# Patient Record
Sex: FEMALE | Race: WHITE | NOT HISPANIC OR LATINO | Employment: FULL TIME | URBAN - METROPOLITAN AREA
[De-identification: names, ages, dates, MRNs, and addresses within clinical notes are randomized per-mention and may not be internally consistent; named-entity substitution may affect disease eponyms.]

---

## 2024-03-28 ENCOUNTER — APPOINTMENT (OUTPATIENT)
Dept: GENERAL RADIOLOGY | Facility: HOSPITAL | Age: 39
DRG: 328 | End: 2024-03-28
Payer: COMMERCIAL

## 2024-03-28 ENCOUNTER — ANESTHESIA EVENT (OUTPATIENT)
Dept: PERIOP | Facility: HOSPITAL | Age: 39
End: 2024-03-28
Payer: COMMERCIAL

## 2024-03-28 ENCOUNTER — HOSPITAL ENCOUNTER (INPATIENT)
Facility: HOSPITAL | Age: 39
LOS: 4 days | Discharge: HOME | DRG: 328 | End: 2024-04-01
Attending: STUDENT IN AN ORGANIZED HEALTH CARE EDUCATION/TRAINING PROGRAM | Admitting: SURGERY
Payer: COMMERCIAL

## 2024-03-28 ENCOUNTER — ANESTHESIA (OUTPATIENT)
Dept: PERIOP | Facility: HOSPITAL | Age: 39
End: 2024-03-28
Payer: COMMERCIAL

## 2024-03-28 ENCOUNTER — APPOINTMENT (OUTPATIENT)
Dept: CT IMAGING | Facility: HOSPITAL | Age: 39
DRG: 328 | End: 2024-03-28
Payer: COMMERCIAL

## 2024-03-28 DIAGNOSIS — K63.1 PERFORATED BOWEL: ICD-10-CM

## 2024-03-28 DIAGNOSIS — Z98.84 HISTORY OF GASTRIC BYPASS: ICD-10-CM

## 2024-03-28 DIAGNOSIS — D72.829 LEUKOCYTOSIS, UNSPECIFIED TYPE: ICD-10-CM

## 2024-03-28 DIAGNOSIS — R19.8 PERFORATION OF VISCUS: Primary | ICD-10-CM

## 2024-03-28 LAB
ABO GROUP BLD: NORMAL
ALBUMIN SERPL-MCNC: 4.5 G/DL (ref 3.5–5.2)
ALBUMIN/GLOB SERPL: 2 G/DL
ALP SERPL-CCNC: 71 U/L (ref 39–117)
ALT SERPL W P-5'-P-CCNC: 15 U/L (ref 1–33)
ANION GAP SERPL CALCULATED.3IONS-SCNC: 13 MMOL/L (ref 5–15)
AST SERPL-CCNC: 15 U/L (ref 1–32)
BASOPHILS # BLD AUTO: 0.06 10*3/MM3 (ref 0–0.2)
BASOPHILS NFR BLD AUTO: 0.3 % (ref 0–1.5)
BILIRUB SERPL-MCNC: 0.6 MG/DL (ref 0–1.2)
BILIRUB UR QL STRIP: NEGATIVE
BLD GP AB SCN SERPL QL: NEGATIVE
BUN SERPL-MCNC: 11 MG/DL (ref 6–20)
BUN/CREAT SERPL: 12.9 (ref 7–25)
CALCIUM SPEC-SCNC: 9.1 MG/DL (ref 8.6–10.5)
CHLORIDE SERPL-SCNC: 111 MMOL/L (ref 98–107)
CLARITY UR: CLEAR
CO2 SERPL-SCNC: 21 MMOL/L (ref 22–29)
COLOR UR: YELLOW
CREAT SERPL-MCNC: 0.85 MG/DL (ref 0.57–1)
D-LACTATE SERPL-SCNC: 1.2 MMOL/L (ref 0.5–2)
DEPRECATED RDW RBC AUTO: 42.1 FL (ref 37–54)
EGFRCR SERPLBLD CKD-EPI 2021: 90.1 ML/MIN/1.73
EOSINOPHIL # BLD AUTO: 0.05 10*3/MM3 (ref 0–0.4)
EOSINOPHIL NFR BLD AUTO: 0.3 % (ref 0.3–6.2)
ERYTHROCYTE [DISTWIDTH] IN BLOOD BY AUTOMATED COUNT: 13.4 % (ref 12.3–15.4)
GLOBULIN UR ELPH-MCNC: 2.2 GM/DL
GLUCOSE SERPL-MCNC: 105 MG/DL (ref 65–99)
GLUCOSE UR STRIP-MCNC: NEGATIVE MG/DL
HCG SERPL QL: NEGATIVE
HCT VFR BLD AUTO: 45.4 % (ref 34–46.6)
HGB BLD-MCNC: 15.7 G/DL (ref 12–15.9)
HGB UR QL STRIP.AUTO: NEGATIVE
HOLD SPECIMEN: NORMAL
IMM GRANULOCYTES # BLD AUTO: 0.08 10*3/MM3 (ref 0–0.05)
IMM GRANULOCYTES NFR BLD AUTO: 0.4 % (ref 0–0.5)
KETONES UR QL STRIP: ABNORMAL
LEUKOCYTE ESTERASE UR QL STRIP.AUTO: NEGATIVE
LIPASE SERPL-CCNC: 29 U/L (ref 13–60)
LYMPHOCYTES # BLD AUTO: 1.1 10*3/MM3 (ref 0.7–3.1)
LYMPHOCYTES NFR BLD AUTO: 5.8 % (ref 19.6–45.3)
MCH RBC QN AUTO: 29.7 PG (ref 26.6–33)
MCHC RBC AUTO-ENTMCNC: 34.6 G/DL (ref 31.5–35.7)
MCV RBC AUTO: 85.8 FL (ref 79–97)
MONOCYTES # BLD AUTO: 0.6 10*3/MM3 (ref 0.1–0.9)
MONOCYTES NFR BLD AUTO: 3.1 % (ref 5–12)
NEUTROPHILS NFR BLD AUTO: 17.24 10*3/MM3 (ref 1.7–7)
NEUTROPHILS NFR BLD AUTO: 90.1 % (ref 42.7–76)
NITRITE UR QL STRIP: NEGATIVE
NRBC BLD AUTO-RTO: 0 /100 WBC (ref 0–0.2)
PH UR STRIP.AUTO: <=5 [PH] (ref 5–8)
PLATELET # BLD AUTO: 209 10*3/MM3 (ref 140–450)
PMV BLD AUTO: 12.2 FL (ref 6–12)
POTASSIUM SERPL-SCNC: 4.3 MMOL/L (ref 3.5–5.2)
PROT SERPL-MCNC: 6.7 G/DL (ref 6–8.5)
PROT UR QL STRIP: NEGATIVE
QT INTERVAL: 387 MS
QTC INTERVAL: 455 MS
RBC # BLD AUTO: 5.29 10*6/MM3 (ref 3.77–5.28)
RH BLD: POSITIVE
SODIUM SERPL-SCNC: 145 MMOL/L (ref 136–145)
SP GR UR STRIP: >=1.03 (ref 1–1.03)
T&S EXPIRATION DATE: NORMAL
TROPONIN T SERPL HS-MCNC: <6 NG/L
UROBILINOGEN UR QL STRIP: ABNORMAL
WBC NRBC COR # BLD AUTO: 19.13 10*3/MM3 (ref 3.4–10.8)
WHOLE BLOOD HOLD COAG: NORMAL
WHOLE BLOOD HOLD SPECIMEN: NORMAL

## 2024-03-28 PROCEDURE — 25010000002 VANCOMYCIN HCL 1.25 G RECONSTITUTED SOLUTION 1 EACH VIAL: Performed by: PHYSICIAN ASSISTANT

## 2024-03-28 PROCEDURE — G0378 HOSPITAL OBSERVATION PER HR: HCPCS

## 2024-03-28 PROCEDURE — 25010000002 FENTANYL CITRATE (PF) 50 MCG/ML SOLUTION: Performed by: NURSE ANESTHETIST, CERTIFIED REGISTERED

## 2024-03-28 PROCEDURE — 80053 COMPREHEN METABOLIC PANEL: CPT | Performed by: PHYSICIAN ASSISTANT

## 2024-03-28 PROCEDURE — 25010000002 HYDROMORPHONE PER 4 MG: Performed by: STUDENT IN AN ORGANIZED HEALTH CARE EDUCATION/TRAINING PROGRAM

## 2024-03-28 PROCEDURE — 36415 COLL VENOUS BLD VENIPUNCTURE: CPT

## 2024-03-28 PROCEDURE — 84703 CHORIONIC GONADOTROPIN ASSAY: CPT | Performed by: PHYSICIAN ASSISTANT

## 2024-03-28 PROCEDURE — 93005 ELECTROCARDIOGRAM TRACING: CPT | Performed by: PHYSICIAN ASSISTANT

## 2024-03-28 PROCEDURE — 25810000003 LACTATED RINGERS PER 1000 ML: Performed by: ANESTHESIOLOGY

## 2024-03-28 PROCEDURE — 86901 BLOOD TYPING SEROLOGIC RH(D): CPT | Performed by: PHYSICIAN ASSISTANT

## 2024-03-28 PROCEDURE — 25810000003 SODIUM CHLORIDE 0.9 % SOLUTION: Performed by: PHYSICIAN ASSISTANT

## 2024-03-28 PROCEDURE — 0DUA47Z SUPPLEMENT JEJUNUM WITH AUTOLOGOUS TISSUE SUBSTITUTE, PERCUTANEOUS ENDOSCOPIC APPROACH: ICD-10-PCS | Performed by: SURGERY

## 2024-03-28 PROCEDURE — 83605 ASSAY OF LACTIC ACID: CPT | Performed by: PHYSICIAN ASSISTANT

## 2024-03-28 PROCEDURE — 25810000003 LACTATED RINGERS PER 1000 ML: Performed by: NURSE ANESTHETIST, CERTIFIED REGISTERED

## 2024-03-28 PROCEDURE — 71045 X-RAY EXAM CHEST 1 VIEW: CPT

## 2024-03-28 PROCEDURE — 0DQA4ZZ REPAIR JEJUNUM, PERCUTANEOUS ENDOSCOPIC APPROACH: ICD-10-PCS | Performed by: SURGERY

## 2024-03-28 PROCEDURE — 85025 COMPLETE CBC W/AUTO DIFF WBC: CPT | Performed by: PHYSICIAN ASSISTANT

## 2024-03-28 PROCEDURE — 25010000002 ONDANSETRON PER 1 MG: Performed by: PHYSICIAN ASSISTANT

## 2024-03-28 PROCEDURE — 25510000001 IOPAMIDOL 61 % SOLUTION: Performed by: STUDENT IN AN ORGANIZED HEALTH CARE EDUCATION/TRAINING PROGRAM

## 2024-03-28 PROCEDURE — 25010000002 HYDROMORPHONE 1 MG/ML SOLUTION: Performed by: STUDENT IN AN ORGANIZED HEALTH CARE EDUCATION/TRAINING PROGRAM

## 2024-03-28 PROCEDURE — 83690 ASSAY OF LIPASE: CPT | Performed by: PHYSICIAN ASSISTANT

## 2024-03-28 PROCEDURE — 99285 EMERGENCY DEPT VISIT HI MDM: CPT

## 2024-03-28 PROCEDURE — 0DQ64ZZ REPAIR STOMACH, PERCUTANEOUS ENDOSCOPIC APPROACH: ICD-10-PCS | Performed by: SURGERY

## 2024-03-28 PROCEDURE — 0DU647Z SUPPLEMENT STOMACH WITH AUTOLOGOUS TISSUE SUBSTITUTE, PERCUTANEOUS ENDOSCOPIC APPROACH: ICD-10-PCS | Performed by: SURGERY

## 2024-03-28 PROCEDURE — 25010000002 MEROPENEM PER 100 MG: Performed by: PHYSICIAN ASSISTANT

## 2024-03-28 PROCEDURE — 93010 ELECTROCARDIOGRAM REPORT: CPT | Performed by: INTERNAL MEDICINE

## 2024-03-28 PROCEDURE — 87040 BLOOD CULTURE FOR BACTERIA: CPT | Performed by: PHYSICIAN ASSISTANT

## 2024-03-28 PROCEDURE — 86850 RBC ANTIBODY SCREEN: CPT | Performed by: PHYSICIAN ASSISTANT

## 2024-03-28 PROCEDURE — 84484 ASSAY OF TROPONIN QUANT: CPT | Performed by: PHYSICIAN ASSISTANT

## 2024-03-28 PROCEDURE — 74177 CT ABD & PELVIS W/CONTRAST: CPT

## 2024-03-28 PROCEDURE — 25010000002 MORPHINE PER 10 MG: Performed by: STUDENT IN AN ORGANIZED HEALTH CARE EDUCATION/TRAINING PROGRAM

## 2024-03-28 PROCEDURE — 25010000002 PROPOFOL 200 MG/20ML EMULSION: Performed by: NURSE ANESTHETIST, CERTIFIED REGISTERED

## 2024-03-28 PROCEDURE — 25810000003 SODIUM CHLORIDE 0.9 % SOLUTION 250 ML FLEX CONT: Performed by: PHYSICIAN ASSISTANT

## 2024-03-28 PROCEDURE — 86900 BLOOD TYPING SEROLOGIC ABO: CPT | Performed by: PHYSICIAN ASSISTANT

## 2024-03-28 PROCEDURE — 81003 URINALYSIS AUTO W/O SCOPE: CPT | Performed by: PHYSICIAN ASSISTANT

## 2024-03-28 RX ORDER — SODIUM CHLORIDE, SODIUM LACTATE, POTASSIUM CHLORIDE, CALCIUM CHLORIDE 600; 310; 30; 20 MG/100ML; MG/100ML; MG/100ML; MG/100ML
9 INJECTION, SOLUTION INTRAVENOUS CONTINUOUS
Status: DISCONTINUED | OUTPATIENT
Start: 2024-03-28 | End: 2024-03-29

## 2024-03-28 RX ORDER — SODIUM CHLORIDE 0.9 % (FLUSH) 0.9 %
10 SYRINGE (ML) INJECTION AS NEEDED
Status: DISCONTINUED | OUTPATIENT
Start: 2024-03-28 | End: 2024-03-29

## 2024-03-28 RX ORDER — ROCURONIUM BROMIDE 10 MG/ML
INJECTION, SOLUTION INTRAVENOUS AS NEEDED
Status: DISCONTINUED | OUTPATIENT
Start: 2024-03-28 | End: 2024-03-29 | Stop reason: SURG

## 2024-03-28 RX ORDER — SODIUM CHLORIDE 0.9 % (FLUSH) 0.9 %
3 SYRINGE (ML) INJECTION EVERY 12 HOURS SCHEDULED
Status: DISCONTINUED | OUTPATIENT
Start: 2024-03-28 | End: 2024-03-28 | Stop reason: HOSPADM

## 2024-03-28 RX ORDER — SODIUM CHLORIDE 0.9 % (FLUSH) 0.9 %
3-10 SYRINGE (ML) INJECTION AS NEEDED
Status: DISCONTINUED | OUTPATIENT
Start: 2024-03-28 | End: 2024-03-28 | Stop reason: HOSPADM

## 2024-03-28 RX ORDER — SODIUM CHLORIDE, SODIUM LACTATE, POTASSIUM CHLORIDE, CALCIUM CHLORIDE 600; 310; 30; 20 MG/100ML; MG/100ML; MG/100ML; MG/100ML
INJECTION, SOLUTION INTRAVENOUS CONTINUOUS PRN
Status: DISCONTINUED | OUTPATIENT
Start: 2024-03-28 | End: 2024-03-29 | Stop reason: SURG

## 2024-03-28 RX ORDER — PROPOFOL 10 MG/ML
INJECTION, EMULSION INTRAVENOUS AS NEEDED
Status: DISCONTINUED | OUTPATIENT
Start: 2024-03-28 | End: 2024-03-29 | Stop reason: SURG

## 2024-03-28 RX ORDER — FAMOTIDINE 10 MG/ML
20 INJECTION, SOLUTION INTRAVENOUS ONCE
Status: COMPLETED | OUTPATIENT
Start: 2024-03-28 | End: 2024-03-28

## 2024-03-28 RX ORDER — MIDAZOLAM HYDROCHLORIDE 1 MG/ML
1 INJECTION INTRAMUSCULAR; INTRAVENOUS
Status: DISCONTINUED | OUTPATIENT
Start: 2024-03-28 | End: 2024-03-28 | Stop reason: HOSPADM

## 2024-03-28 RX ORDER — LIDOCAINE HYDROCHLORIDE 10 MG/ML
0.5 INJECTION, SOLUTION INFILTRATION; PERINEURAL ONCE AS NEEDED
Status: DISCONTINUED | OUTPATIENT
Start: 2024-03-28 | End: 2024-03-28 | Stop reason: HOSPADM

## 2024-03-28 RX ORDER — HYDROMORPHONE HYDROCHLORIDE 1 MG/ML
0.5 INJECTION, SOLUTION INTRAMUSCULAR; INTRAVENOUS; SUBCUTANEOUS ONCE
Status: COMPLETED | OUTPATIENT
Start: 2024-03-28 | End: 2024-03-28

## 2024-03-28 RX ORDER — LIDOCAINE HYDROCHLORIDE 20 MG/ML
INJECTION, SOLUTION INFILTRATION; PERINEURAL AS NEEDED
Status: DISCONTINUED | OUTPATIENT
Start: 2024-03-28 | End: 2024-03-29 | Stop reason: SURG

## 2024-03-28 RX ORDER — FENTANYL CITRATE 50 UG/ML
50 INJECTION, SOLUTION INTRAMUSCULAR; INTRAVENOUS ONCE AS NEEDED
Status: DISCONTINUED | OUTPATIENT
Start: 2024-03-28 | End: 2024-03-28 | Stop reason: HOSPADM

## 2024-03-28 RX ORDER — FENTANYL CITRATE 50 UG/ML
INJECTION, SOLUTION INTRAMUSCULAR; INTRAVENOUS AS NEEDED
Status: DISCONTINUED | OUTPATIENT
Start: 2024-03-28 | End: 2024-03-29 | Stop reason: SURG

## 2024-03-28 RX ORDER — MORPHINE SULFATE 2 MG/ML
4 INJECTION, SOLUTION INTRAMUSCULAR; INTRAVENOUS ONCE
Status: COMPLETED | OUTPATIENT
Start: 2024-03-28 | End: 2024-03-28

## 2024-03-28 RX ORDER — SUCCINYLCHOLINE/SOD CL,ISO/PF 200MG/10ML
SYRINGE (ML) INTRAVENOUS AS NEEDED
Status: DISCONTINUED | OUTPATIENT
Start: 2024-03-28 | End: 2024-03-29 | Stop reason: SURG

## 2024-03-28 RX ORDER — ONDANSETRON 2 MG/ML
4 INJECTION INTRAMUSCULAR; INTRAVENOUS ONCE
Status: COMPLETED | OUTPATIENT
Start: 2024-03-28 | End: 2024-03-28

## 2024-03-28 RX ADMIN — FENTANYL CITRATE 25 MCG: 50 INJECTION, SOLUTION INTRAMUSCULAR; INTRAVENOUS at 23:29

## 2024-03-28 RX ADMIN — MORPHINE SULFATE 4 MG: 2 INJECTION, SOLUTION INTRAMUSCULAR; INTRAVENOUS at 18:16

## 2024-03-28 RX ADMIN — FENTANYL CITRATE 25 MCG: 50 INJECTION, SOLUTION INTRAMUSCULAR; INTRAVENOUS at 23:37

## 2024-03-28 RX ADMIN — HYDROMORPHONE HYDROCHLORIDE 0.5 MG: 1 INJECTION, SOLUTION INTRAMUSCULAR; INTRAVENOUS; SUBCUTANEOUS at 20:07

## 2024-03-28 RX ADMIN — Medication 80 MG: at 23:29

## 2024-03-28 RX ADMIN — SODIUM CHLORIDE, POTASSIUM CHLORIDE, SODIUM LACTATE AND CALCIUM CHLORIDE: 600; 310; 30; 20 INJECTION, SOLUTION INTRAVENOUS at 23:24

## 2024-03-28 RX ADMIN — ROCURONIUM BROMIDE 40 MG: 10 INJECTION, SOLUTION INTRAVENOUS at 23:44

## 2024-03-28 RX ADMIN — HYDROMORPHONE HYDROCHLORIDE 0.5 MG: 1 INJECTION, SOLUTION INTRAMUSCULAR; INTRAVENOUS; SUBCUTANEOUS at 23:52

## 2024-03-28 RX ADMIN — FAMOTIDINE 20 MG: 10 INJECTION INTRAVENOUS at 23:06

## 2024-03-28 RX ADMIN — SODIUM CHLORIDE, POTASSIUM CHLORIDE, SODIUM LACTATE AND CALCIUM CHLORIDE 9 ML/HR: 600; 310; 30; 20 INJECTION, SOLUTION INTRAVENOUS at 23:06

## 2024-03-28 RX ADMIN — LIDOCAINE HYDROCHLORIDE 50 MG: 20 INJECTION, SOLUTION INFILTRATION; PERINEURAL at 23:29

## 2024-03-28 RX ADMIN — ROCURONIUM BROMIDE 10 MG: 10 INJECTION, SOLUTION INTRAVENOUS at 23:29

## 2024-03-28 RX ADMIN — PROPOFOL 200 MG: 10 INJECTION, EMULSION INTRAVENOUS at 23:29

## 2024-03-28 RX ADMIN — MEROPENEM 1000 MG: 1 INJECTION, POWDER, FOR SOLUTION INTRAVENOUS at 20:51

## 2024-03-28 RX ADMIN — VANCOMYCIN HYDROCHLORIDE 1250 MG: 1.25 INJECTION, POWDER, LYOPHILIZED, FOR SOLUTION INTRAVENOUS at 21:59

## 2024-03-28 RX ADMIN — HYDROMORPHONE HYDROCHLORIDE 1 MG: 1 INJECTION, SOLUTION INTRAMUSCULAR; INTRAVENOUS; SUBCUTANEOUS at 22:42

## 2024-03-28 RX ADMIN — ONDANSETRON 4 MG: 2 INJECTION INTRAMUSCULAR; INTRAVENOUS at 18:17

## 2024-03-28 RX ADMIN — SODIUM CHLORIDE 1000 ML: 9 INJECTION, SOLUTION INTRAVENOUS at 18:17

## 2024-03-28 RX ADMIN — IOPAMIDOL 85 ML: 612 INJECTION, SOLUTION INTRAVENOUS at 19:37

## 2024-03-28 NOTE — ED TRIAGE NOTES
Pt to ED via EMS with complaints of LLQ pain radiating to L shoulder, feels like spasms per EMS. X4 hours

## 2024-03-28 NOTE — ED PROVIDER NOTES
EMERGENCY DEPARTMENT MD ATTESTATION NOTE    Room Number:  05/05  PCP: Provider, No Known  Independent Historians: Patient and Family    HPI:    Context: Radha Vargas is a 38 y.o. female who presents to the ED c/o acute left lower quadrant abdominal pain that radiates up into her chest and neck.  Symptoms began acutely around 2:00 today.  Patient additionally notes spasms.        Review of prior external notes (non-ED) -and- Review of prior external test results outside of this encounter: Office visit with primary care from 11/22/2023 reviewed and notable for visit secondary to right shoulder pain.  Patient was diagnosed with right rotator cuff tear with plan for operative repair on 12/21.    PHYSICAL EXAM    I have reviewed the triage vital signs and nursing notes.    ED Triage Vitals [03/28/24 1741]   Temp Heart Rate Resp BP SpO2   97.6 °F (36.4 °C) 60 20 117/62 100 %      Temp src Heart Rate Source Patient Position BP Location FiO2 (%)   -- -- -- -- --       Physical Exam  GENERAL: alert, no acute distress  SKIN: Warm, dry  HENT: Normocephalic, atraumatic  EYES: no scleral icterus  CV: regular rhythm, regular rate  RESPIRATORY: normal effort, lungs clear  ABDOMEN: soft, severe tenderness to palpation in the left lower quadrant MUSCULOSKELETAL: no deformity  NEURO: alert, moves all extremities, follows commands            MEDICATIONS GIVEN IN ER  Medications   sodium chloride 0.9 % flush 10 mL (has no administration in time range)   sodium chloride 0.9 % flush 10 mL (has no administration in time range)   sodium chloride 0.9 % bolus 1,000 mL (1,000 mL Intravenous New Bag 3/28/24 1817)   ondansetron (ZOFRAN) injection 4 mg (4 mg Intravenous Given 3/28/24 1817)   morphine injection 4 mg (4 mg Intravenous Given 3/28/24 1816)         ORDERS PLACED DURING THIS VISIT:  Orders Placed This Encounter   Procedures    XR Chest 1 View    CT Abdomen Pelvis With Contrast    [place] Draw    Comprehensive Metabolic Panel     Lipase    hCG, Serum, Qualitative    Urinalysis With Microscopic If Indicated (No Culture) - Urine, Clean Catch    Single High Sensitivity Troponin T    CBC Auto Differential    ECG 12 Lead Other; neck pain    Insert peripheral IV    Insert Peripheral IV    Green Top (Gel)    Lavender Top    Gold Top - SST    Gray Top    Light Blue Top    CBC & Differential         PROGRESS, DATA ANALYSIS, CONSULTS, AND MEDICAL DECISION MAKING  All labs have been independently interpreted by me.  All radiology studies have been reviewed by me. All EKG's have been independently viewed and interpreted by me.  Discussion below represents my analysis of pertinent findings related to patient's condition, differential diagnosis, treatment plan and final disposition.    Differential diagnosis includes but is not limited to diverticulitis, SBO, volvulus, gastroenteritis.    Clinical Scores:                   ED Course as of 03/28/24 2320   Thu Mar 28, 2024   1833 EKG interpreted by me demonstrates sinus rhythm, rate 83, no WI/QT prolongation, no ST elevation [MW]   1848 Lipase: 29 [DC]   1848 WBC(!): 19.13 [DC]   1848 Hemoglobin: 15.7 [DC]   1848 Platelets: 209 [DC]   1848 Creatinine: 0.85 [DC]   1848 Sodium: 145 [DC]   1848 Potassium: 4.3 [DC]   1848 CO2(!): 21.0 [DC]   1848 HS Troponin T: <6 [DC]   2002 Discussed CT with radiologist, Dr. Connor, who reports free air, possibly gastric in origin is noted. Pt has abnormal anatomy secondary to surgical history. No obvious diverticulitis. [DC]   2007 Amoxicillin caused him to swell.  Will give patient meropenem and vancomycin per pharmacy recommendation.  [DC]   2007 Discussed results with patient and plan for antibiotics and admission.  He reports gastric bypass was performed by Dr. Hansen in [place] about 3 years ago.  She did have a hernia repair performed after the surgery but no other reported complications. [DC]   2039 Discussed case with Dr. Colon, surgery, who recommends consult  with bariatric surgery for possible complication from previous gastric bypass. [DC]   2040 Lactate: 1.2 [DC]   2055 Discussed case with Dr. Whitley, bariatric surgery, who recommends discussing with patient's surgeon and transfer to [place] for care. [DC]   2155 Repaged bariatric surgery, Dr. Grace. [DC]   2231 We have paged Dr. Grace's surgical group on-call 4 times but have not received return call.  Repaged Dr. Whitley and discussed situation.  He will come in and recommends taking patient to the OR at this time.  Discussed plan with patient who is agreeable. [DC]      ED Course User Index  [DC] Samantha Cuellar PA  [MW] Demetri Brunner MD       MDM: 38-year-old female presenting for evaluation of severe abdominal pain.  Patient with noted leukocytosis of 20 and severe tenderness to palpation.  Radiological evaluation demonstrates findings concerning for free air and history of bariatric surgery.  There is concern for probable ruptured viscus.  Patient will be initiated on vancomycin and meropenem.  Patient's primary bariatric surgery and has not in town here.  We consulted our bariatric services who recommend transfer to patient's home facility.  We are unable to get a hold of patient's initial surgeon.  Our surgical services graciously agreed to take patient to the OR for emergent intervention.      COMPLEXITY OF CARE  The patient requires admission.    Please note that portions of this document were completed with a voice recognition program.    Note Disclaimer: At [facility], we believe that sharing information builds trust and better relationships. You are receiving this note because you recently visited [facility]. It is possible you will see health information before a provider has talked with you about it. This kind of information can be easy to misunderstand. To help you fully understand what it means for your health, we urge you to discuss this note with your provider.         Kannan  Demetri ADRIAN MD  03/28/24 0464

## 2024-03-29 PROBLEM — D72.829 LEUKOCYTOSIS: Status: ACTIVE | Noted: 2024-03-29

## 2024-03-29 PROBLEM — Z98.84 HISTORY OF GASTRIC BYPASS: Status: ACTIVE | Noted: 2024-03-29

## 2024-03-29 PROBLEM — R19.8 PERFORATION OF VISCUS: Status: ACTIVE | Noted: 2024-03-29

## 2024-03-29 LAB
ALBUMIN SERPL-MCNC: 3.7 G/DL (ref 3.5–5.2)
ALBUMIN/GLOB SERPL: 1.7 G/DL
ALP SERPL-CCNC: 54 U/L (ref 39–117)
ALT SERPL W P-5'-P-CCNC: 67 U/L (ref 1–33)
ANION GAP SERPL CALCULATED.3IONS-SCNC: 11 MMOL/L (ref 5–15)
AST SERPL-CCNC: 50 U/L (ref 1–32)
BASOPHILS # BLD AUTO: 0.01 10*3/MM3 (ref 0–0.2)
BASOPHILS NFR BLD AUTO: 0.1 % (ref 0–1.5)
BILIRUB SERPL-MCNC: 0.6 MG/DL (ref 0–1.2)
BUN SERPL-MCNC: 10 MG/DL (ref 6–20)
BUN/CREAT SERPL: 12 (ref 7–25)
CALCIUM SPEC-SCNC: 8.3 MG/DL (ref 8.6–10.5)
CHLORIDE SERPL-SCNC: 112 MMOL/L (ref 98–107)
CO2 SERPL-SCNC: 20 MMOL/L (ref 22–29)
CREAT SERPL-MCNC: 0.83 MG/DL (ref 0.57–1)
DEPRECATED RDW RBC AUTO: 39.8 FL (ref 37–54)
EGFRCR SERPLBLD CKD-EPI 2021: 92.7 ML/MIN/1.73
EOSINOPHIL # BLD AUTO: 0 10*3/MM3 (ref 0–0.4)
EOSINOPHIL NFR BLD AUTO: 0 % (ref 0.3–6.2)
ERYTHROCYTE [DISTWIDTH] IN BLOOD BY AUTOMATED COUNT: 13 % (ref 12.3–15.4)
GLOBULIN UR ELPH-MCNC: 2.2 GM/DL
GLUCOSE SERPL-MCNC: 161 MG/DL (ref 65–99)
HCT VFR BLD AUTO: 36.8 % (ref 34–46.6)
HGB BLD-MCNC: 12.3 G/DL (ref 12–15.9)
IMM GRANULOCYTES # BLD AUTO: 0.13 10*3/MM3 (ref 0–0.05)
IMM GRANULOCYTES NFR BLD AUTO: 0.8 % (ref 0–0.5)
LYMPHOCYTES # BLD AUTO: 0.38 10*3/MM3 (ref 0.7–3.1)
LYMPHOCYTES NFR BLD AUTO: 2.3 % (ref 19.6–45.3)
MAGNESIUM SERPL-MCNC: 1.6 MG/DL (ref 1.6–2.6)
MCH RBC QN AUTO: 28.2 PG (ref 26.6–33)
MCHC RBC AUTO-ENTMCNC: 33.4 G/DL (ref 31.5–35.7)
MCV RBC AUTO: 84.4 FL (ref 79–97)
MONOCYTES # BLD AUTO: 0.42 10*3/MM3 (ref 0.1–0.9)
MONOCYTES NFR BLD AUTO: 2.5 % (ref 5–12)
NEUTROPHILS NFR BLD AUTO: 15.79 10*3/MM3 (ref 1.7–7)
NEUTROPHILS NFR BLD AUTO: 94.3 % (ref 42.7–76)
NRBC BLD AUTO-RTO: 0 /100 WBC (ref 0–0.2)
PHOSPHATE SERPL-MCNC: 3.2 MG/DL (ref 2.5–4.5)
PLATELET # BLD AUTO: 162 10*3/MM3 (ref 140–450)
PMV BLD AUTO: 12 FL (ref 6–12)
POTASSIUM SERPL-SCNC: 4.4 MMOL/L (ref 3.5–5.2)
PROT SERPL-MCNC: 5.9 G/DL (ref 6–8.5)
RBC # BLD AUTO: 4.36 10*6/MM3 (ref 3.77–5.28)
SODIUM SERPL-SCNC: 143 MMOL/L (ref 136–145)
WBC NRBC COR # BLD AUTO: 16.73 10*3/MM3 (ref 3.4–10.8)

## 2024-03-29 PROCEDURE — 85025 COMPLETE CBC W/AUTO DIFF WBC: CPT | Performed by: SURGERY

## 2024-03-29 PROCEDURE — 25010000002 ONDANSETRON PER 1 MG: Performed by: NURSE ANESTHETIST, CERTIFIED REGISTERED

## 2024-03-29 PROCEDURE — 25010000002 MEROPENEM PER 100 MG: Performed by: SURGERY

## 2024-03-29 PROCEDURE — 25010000002 HYDROMORPHONE 1 MG/ML SOLUTION: Performed by: NURSE ANESTHETIST, CERTIFIED REGISTERED

## 2024-03-29 PROCEDURE — 80053 COMPREHEN METABOLIC PANEL: CPT | Performed by: SURGERY

## 2024-03-29 PROCEDURE — G0378 HOSPITAL OBSERVATION PER HR: HCPCS

## 2024-03-29 PROCEDURE — 25010000002 FLUCONAZOLE PER 200 MG: Performed by: SURGERY

## 2024-03-29 PROCEDURE — 87070 CULTURE OTHR SPECIMN AEROBIC: CPT | Performed by: SURGERY

## 2024-03-29 PROCEDURE — 25010000002 METOCLOPRAMIDE PER 10 MG: Performed by: SURGERY

## 2024-03-29 PROCEDURE — 25010000002 SUGAMMADEX 200 MG/2ML SOLUTION: Performed by: NURSE ANESTHETIST, CERTIFIED REGISTERED

## 2024-03-29 PROCEDURE — 25010000002 HYDROMORPHONE PER 4 MG: Performed by: NURSE ANESTHETIST, CERTIFIED REGISTERED

## 2024-03-29 PROCEDURE — 87015 SPECIMEN INFECT AGNT CONCNTJ: CPT | Performed by: SURGERY

## 2024-03-29 PROCEDURE — 87205 SMEAR GRAM STAIN: CPT | Performed by: SURGERY

## 2024-03-29 PROCEDURE — 87147 CULTURE TYPE IMMUNOLOGIC: CPT | Performed by: SURGERY

## 2024-03-29 PROCEDURE — 25010000002 HYDROMORPHONE PER 4 MG: Performed by: SURGERY

## 2024-03-29 PROCEDURE — 83735 ASSAY OF MAGNESIUM: CPT | Performed by: SURGERY

## 2024-03-29 PROCEDURE — 84100 ASSAY OF PHOSPHORUS: CPT | Performed by: SURGERY

## 2024-03-29 PROCEDURE — 25810000003 LACTATED RINGERS PER 1000 ML: Performed by: SURGERY

## 2024-03-29 RX ORDER — MAGNESIUM HYDROXIDE 1200 MG/15ML
LIQUID ORAL AS NEEDED
Status: DISCONTINUED | OUTPATIENT
Start: 2024-03-29 | End: 2024-03-29 | Stop reason: HOSPADM

## 2024-03-29 RX ORDER — BUPIVACAINE HYDROCHLORIDE AND EPINEPHRINE 5; 5 MG/ML; UG/ML
INJECTION, SOLUTION EPIDURAL; INTRACAUDAL; PERINEURAL AS NEEDED
Status: DISCONTINUED | OUTPATIENT
Start: 2024-03-28 | End: 2024-03-29 | Stop reason: HOSPADM

## 2024-03-29 RX ORDER — HYDRALAZINE HYDROCHLORIDE 20 MG/ML
10 INJECTION INTRAMUSCULAR; INTRAVENOUS
Status: DISCONTINUED | OUTPATIENT
Start: 2024-03-29 | End: 2024-04-01 | Stop reason: HOSPADM

## 2024-03-29 RX ORDER — EPHEDRINE SULFATE 50 MG/ML
5 INJECTION, SOLUTION INTRAVENOUS ONCE AS NEEDED
Status: DISCONTINUED | OUTPATIENT
Start: 2024-03-29 | End: 2024-03-29 | Stop reason: HOSPADM

## 2024-03-29 RX ORDER — IPRATROPIUM BROMIDE AND ALBUTEROL SULFATE 2.5; .5 MG/3ML; MG/3ML
3 SOLUTION RESPIRATORY (INHALATION) ONCE AS NEEDED
Status: DISCONTINUED | OUTPATIENT
Start: 2024-03-29 | End: 2024-03-29 | Stop reason: HOSPADM

## 2024-03-29 RX ORDER — PROCHLORPERAZINE EDISYLATE 5 MG/ML
10 INJECTION INTRAMUSCULAR; INTRAVENOUS EVERY 6 HOURS PRN
Status: DISCONTINUED | OUTPATIENT
Start: 2024-03-29 | End: 2024-04-01 | Stop reason: HOSPADM

## 2024-03-29 RX ORDER — HYDRALAZINE HYDROCHLORIDE 20 MG/ML
5 INJECTION INTRAMUSCULAR; INTRAVENOUS
Status: DISCONTINUED | OUTPATIENT
Start: 2024-03-29 | End: 2024-03-29 | Stop reason: HOSPADM

## 2024-03-29 RX ORDER — NALOXONE HCL 0.4 MG/ML
0.2 VIAL (ML) INJECTION AS NEEDED
Status: DISCONTINUED | OUTPATIENT
Start: 2024-03-29 | End: 2024-03-29 | Stop reason: HOSPADM

## 2024-03-29 RX ORDER — CETIRIZINE HYDROCHLORIDE 10 MG/1
10 TABLET ORAL DAILY
COMMUNITY

## 2024-03-29 RX ORDER — ONDANSETRON 2 MG/ML
4 INJECTION INTRAMUSCULAR; INTRAVENOUS ONCE AS NEEDED
Status: DISCONTINUED | OUTPATIENT
Start: 2024-03-29 | End: 2024-03-29 | Stop reason: HOSPADM

## 2024-03-29 RX ORDER — CYANOCOBALAMIN 1000 UG/ML
1000 INJECTION, SOLUTION INTRAMUSCULAR; SUBCUTANEOUS ONCE
Status: COMPLETED | OUTPATIENT
Start: 2024-03-30 | End: 2024-03-30

## 2024-03-29 RX ORDER — LABETALOL HYDROCHLORIDE 5 MG/ML
5 INJECTION, SOLUTION INTRAVENOUS
Status: DISCONTINUED | OUTPATIENT
Start: 2024-03-29 | End: 2024-03-29 | Stop reason: HOSPADM

## 2024-03-29 RX ORDER — DROPERIDOL 2.5 MG/ML
0.62 INJECTION, SOLUTION INTRAMUSCULAR; INTRAVENOUS
Status: DISCONTINUED | OUTPATIENT
Start: 2024-03-29 | End: 2024-03-29 | Stop reason: HOSPADM

## 2024-03-29 RX ORDER — HYDROMORPHONE HYDROCHLORIDE 1 MG/ML
0.5 INJECTION, SOLUTION INTRAMUSCULAR; INTRAVENOUS; SUBCUTANEOUS
Status: DISCONTINUED | OUTPATIENT
Start: 2024-03-29 | End: 2024-03-30

## 2024-03-29 RX ORDER — FENTANYL CITRATE 50 UG/ML
50 INJECTION, SOLUTION INTRAMUSCULAR; INTRAVENOUS
Status: DISCONTINUED | OUTPATIENT
Start: 2024-03-29 | End: 2024-03-29 | Stop reason: HOSPADM

## 2024-03-29 RX ORDER — ONDANSETRON 2 MG/ML
INJECTION INTRAMUSCULAR; INTRAVENOUS AS NEEDED
Status: DISCONTINUED | OUTPATIENT
Start: 2024-03-29 | End: 2024-03-29 | Stop reason: SURG

## 2024-03-29 RX ORDER — HYDROCODONE BITARTRATE AND ACETAMINOPHEN 5; 325 MG/1; MG/1
1 TABLET ORAL ONCE AS NEEDED
Status: DISCONTINUED | OUTPATIENT
Start: 2024-03-29 | End: 2024-03-29 | Stop reason: HOSPADM

## 2024-03-29 RX ORDER — PANTOPRAZOLE SODIUM 40 MG/10ML
40 INJECTION, POWDER, LYOPHILIZED, FOR SOLUTION INTRAVENOUS EVERY 12 HOURS SCHEDULED
Status: DISCONTINUED | OUTPATIENT
Start: 2024-03-29 | End: 2024-03-31

## 2024-03-29 RX ORDER — ALBUTEROL SULFATE 2.5 MG/3ML
2.5 SOLUTION RESPIRATORY (INHALATION) EVERY 4 HOURS PRN
Status: DISCONTINUED | OUTPATIENT
Start: 2024-03-29 | End: 2024-04-01 | Stop reason: HOSPADM

## 2024-03-29 RX ORDER — METOCLOPRAMIDE HYDROCHLORIDE 5 MG/ML
10 INJECTION INTRAMUSCULAR; INTRAVENOUS EVERY 6 HOURS
Status: DISCONTINUED | OUTPATIENT
Start: 2024-03-29 | End: 2024-04-01 | Stop reason: HOSPADM

## 2024-03-29 RX ORDER — NALOXONE HCL 0.4 MG/ML
0.1 VIAL (ML) INJECTION
Status: DISCONTINUED | OUTPATIENT
Start: 2024-03-29 | End: 2024-04-01 | Stop reason: HOSPADM

## 2024-03-29 RX ORDER — PROMETHAZINE HYDROCHLORIDE 25 MG/1
25 SUPPOSITORY RECTAL ONCE AS NEEDED
Status: DISCONTINUED | OUTPATIENT
Start: 2024-03-29 | End: 2024-03-29 | Stop reason: HOSPADM

## 2024-03-29 RX ORDER — ONDANSETRON 2 MG/ML
4 INJECTION INTRAMUSCULAR; INTRAVENOUS EVERY 4 HOURS PRN
Status: DISCONTINUED | OUTPATIENT
Start: 2024-03-29 | End: 2024-04-01 | Stop reason: HOSPADM

## 2024-03-29 RX ORDER — FLUMAZENIL 0.1 MG/ML
0.2 INJECTION INTRAVENOUS AS NEEDED
Status: DISCONTINUED | OUTPATIENT
Start: 2024-03-29 | End: 2024-03-29 | Stop reason: HOSPADM

## 2024-03-29 RX ORDER — DIPHENHYDRAMINE HYDROCHLORIDE 50 MG/ML
12.5 INJECTION INTRAMUSCULAR; INTRAVENOUS
Status: DISCONTINUED | OUTPATIENT
Start: 2024-03-29 | End: 2024-03-29 | Stop reason: HOSPADM

## 2024-03-29 RX ORDER — SODIUM CHLORIDE, SODIUM LACTATE, POTASSIUM CHLORIDE, CALCIUM CHLORIDE 600; 310; 30; 20 MG/100ML; MG/100ML; MG/100ML; MG/100ML
75 INJECTION, SOLUTION INTRAVENOUS CONTINUOUS
Status: DISCONTINUED | OUTPATIENT
Start: 2024-03-29 | End: 2024-04-01 | Stop reason: HOSPADM

## 2024-03-29 RX ORDER — OXYCODONE AND ACETAMINOPHEN 7.5; 325 MG/1; MG/1
1 TABLET ORAL EVERY 4 HOURS PRN
Status: DISCONTINUED | OUTPATIENT
Start: 2024-03-29 | End: 2024-03-29 | Stop reason: HOSPADM

## 2024-03-29 RX ORDER — HYDROMORPHONE HYDROCHLORIDE 1 MG/ML
0.5 INJECTION, SOLUTION INTRAMUSCULAR; INTRAVENOUS; SUBCUTANEOUS
Status: DISCONTINUED | OUTPATIENT
Start: 2024-03-29 | End: 2024-03-29 | Stop reason: HOSPADM

## 2024-03-29 RX ORDER — ONDANSETRON 4 MG/1
4 TABLET, ORALLY DISINTEGRATING ORAL EVERY 4 HOURS PRN
Status: DISCONTINUED | OUTPATIENT
Start: 2024-03-29 | End: 2024-04-01 | Stop reason: HOSPADM

## 2024-03-29 RX ORDER — DIPHENHYDRAMINE HYDROCHLORIDE 50 MG/ML
25 INJECTION INTRAMUSCULAR; INTRAVENOUS EVERY 4 HOURS PRN
Status: DISCONTINUED | OUTPATIENT
Start: 2024-03-29 | End: 2024-04-01 | Stop reason: HOSPADM

## 2024-03-29 RX ORDER — FLUCONAZOLE 2 MG/ML
200 INJECTION, SOLUTION INTRAVENOUS EVERY 24 HOURS
Status: DISCONTINUED | OUTPATIENT
Start: 2024-03-29 | End: 2024-03-30

## 2024-03-29 RX ORDER — PROMETHAZINE HYDROCHLORIDE 25 MG/1
25 TABLET ORAL ONCE AS NEEDED
Status: DISCONTINUED | OUTPATIENT
Start: 2024-03-29 | End: 2024-03-29 | Stop reason: HOSPADM

## 2024-03-29 RX ORDER — PROMETHAZINE HYDROCHLORIDE 12.5 MG/1
12.5 TABLET ORAL EVERY 4 HOURS PRN
Status: DISCONTINUED | OUTPATIENT
Start: 2024-03-29 | End: 2024-04-01 | Stop reason: HOSPADM

## 2024-03-29 RX ORDER — PROMETHAZINE HYDROCHLORIDE 12.5 MG/1
12.5 SUPPOSITORY RECTAL EVERY 4 HOURS PRN
Status: DISCONTINUED | OUTPATIENT
Start: 2024-03-29 | End: 2024-04-01 | Stop reason: HOSPADM

## 2024-03-29 RX ADMIN — HYDROMORPHONE HYDROCHLORIDE 0.5 MG: 1 INJECTION, SOLUTION INTRAMUSCULAR; INTRAVENOUS; SUBCUTANEOUS at 16:18

## 2024-03-29 RX ADMIN — FLUCONAZOLE 200 MG: 200 INJECTION, SOLUTION INTRAVENOUS at 10:56

## 2024-03-29 RX ADMIN — METOCLOPRAMIDE 10 MG: 5 INJECTION, SOLUTION INTRAMUSCULAR; INTRAVENOUS at 12:03

## 2024-03-29 RX ADMIN — SODIUM CHLORIDE, POTASSIUM CHLORIDE, SODIUM LACTATE AND CALCIUM CHLORIDE 150 ML/HR: 600; 310; 30; 20 INJECTION, SOLUTION INTRAVENOUS at 03:48

## 2024-03-29 RX ADMIN — HYDROMORPHONE HYDROCHLORIDE 0.5 MG: 1 INJECTION, SOLUTION INTRAMUSCULAR; INTRAVENOUS; SUBCUTANEOUS at 10:57

## 2024-03-29 RX ADMIN — HYDROMORPHONE HYDROCHLORIDE 0.5 MG: 1 INJECTION, SOLUTION INTRAMUSCULAR; INTRAVENOUS; SUBCUTANEOUS at 19:26

## 2024-03-29 RX ADMIN — METOCLOPRAMIDE 10 MG: 5 INJECTION, SOLUTION INTRAMUSCULAR; INTRAVENOUS at 17:16

## 2024-03-29 RX ADMIN — HYDROMORPHONE HYDROCHLORIDE 0.5 MG: 1 INJECTION, SOLUTION INTRAMUSCULAR; INTRAVENOUS; SUBCUTANEOUS at 00:17

## 2024-03-29 RX ADMIN — MEROPENEM 500 MG: 500 INJECTION, POWDER, FOR SOLUTION INTRAVENOUS at 17:15

## 2024-03-29 RX ADMIN — HYDROMORPHONE HYDROCHLORIDE 0.5 MG: 1 INJECTION, SOLUTION INTRAMUSCULAR; INTRAVENOUS; SUBCUTANEOUS at 21:53

## 2024-03-29 RX ADMIN — PANTOPRAZOLE SODIUM 40 MG: 40 INJECTION, POWDER, FOR SOLUTION INTRAVENOUS at 21:53

## 2024-03-29 RX ADMIN — HYDROMORPHONE HYDROCHLORIDE 0.5 MG: 1 INJECTION, SOLUTION INTRAMUSCULAR; INTRAVENOUS; SUBCUTANEOUS at 03:18

## 2024-03-29 RX ADMIN — METOCLOPRAMIDE 10 MG: 5 INJECTION, SOLUTION INTRAMUSCULAR; INTRAVENOUS at 21:53

## 2024-03-29 RX ADMIN — MEROPENEM 500 MG: 500 INJECTION, POWDER, FOR SOLUTION INTRAVENOUS at 04:45

## 2024-03-29 RX ADMIN — METOCLOPRAMIDE 10 MG: 5 INJECTION, SOLUTION INTRAMUSCULAR; INTRAVENOUS at 04:10

## 2024-03-29 RX ADMIN — ONDANSETRON 4 MG: 2 INJECTION INTRAMUSCULAR; INTRAVENOUS at 00:31

## 2024-03-29 RX ADMIN — SODIUM CHLORIDE, POTASSIUM CHLORIDE, SODIUM LACTATE AND CALCIUM CHLORIDE 150 ML/HR: 600; 310; 30; 20 INJECTION, SOLUTION INTRAVENOUS at 09:16

## 2024-03-29 RX ADMIN — HYDROMORPHONE HYDROCHLORIDE 0.5 MG: 1 INJECTION, SOLUTION INTRAMUSCULAR; INTRAVENOUS; SUBCUTANEOUS at 05:55

## 2024-03-29 RX ADMIN — MEROPENEM 500 MG: 500 INJECTION, POWDER, FOR SOLUTION INTRAVENOUS at 12:03

## 2024-03-29 RX ADMIN — HYDROMORPHONE HYDROCHLORIDE 0.5 MG: 1 INJECTION, SOLUTION INTRAMUSCULAR; INTRAVENOUS; SUBCUTANEOUS at 13:39

## 2024-03-29 RX ADMIN — SODIUM CHLORIDE, POTASSIUM CHLORIDE, SODIUM LACTATE AND CALCIUM CHLORIDE 150 ML/HR: 600; 310; 30; 20 INJECTION, SOLUTION INTRAVENOUS at 19:26

## 2024-03-29 RX ADMIN — PANTOPRAZOLE SODIUM 40 MG: 40 INJECTION, POWDER, FOR SOLUTION INTRAVENOUS at 09:16

## 2024-03-29 RX ADMIN — MEROPENEM 500 MG: 500 INJECTION, POWDER, FOR SOLUTION INTRAVENOUS at 22:02

## 2024-03-29 RX ADMIN — SUGAMMADEX 200 MG: 100 INJECTION, SOLUTION INTRAVENOUS at 00:40

## 2024-03-29 NOTE — PROGRESS NOTES
LOS: 0 days   Patient Care Team:  Provider, No Known as PCP - General    Chief Complaint: Abdominal soreness but feels much better    Interval History:   Patient Denies: Nausea, vomiting, chest pain, shortness of air, lower extremity pain  Patient states that she is feeling better only with abdominal soreness      Vital Signs  Temp:  [97.6 °F (36.4 °C)-98.9 °F (37.2 °C)] 98.7 °F (37.1 °C)  Heart Rate:  [] 67  Resp:  [12-20] 16  BP: (109-147)/() 110/61      Physical Exam:   Heart: RR   Abd: Soft and nondistended; incisions clean, dry intact without erythema; Alexandre drain with serosanguineous output   Ext:  No clubbing, cyanosis     Results Review:     I reviewed the patient's new clinical results.    Lab Results (last 24 hours)       Procedure Component Value Units Date/Time    Comprehensive Metabolic Panel [[id number]]  (Abnormal) Collected: 03/29/24 0552    Specimen: Blood Updated: 03/29/24 0659     Glucose 161 mg/dL      BUN 10 mg/dL      Creatinine 0.83 mg/dL      Sodium 143 mmol/L      Potassium 4.4 mmol/L      Chloride 112 mmol/L      CO2 20.0 mmol/L      Calcium 8.3 mg/dL      Total Protein 5.9 g/dL      Albumin 3.7 g/dL      ALT (SGPT) 67 U/L      AST (SGOT) 50 U/L      Alkaline Phosphatase 54 U/L      Total Bilirubin 0.6 mg/dL      Globulin 2.2 gm/dL      A/G Ratio 1.7 g/dL      BUN/Creatinine Ratio 12.0     Anion Gap 11.0 mmol/L      eGFR 92.7 mL/min/1.73     Narrative:      GFR Normal >60  Chronic Kidney Disease <60  Kidney Failure <15      Phosphorus [[id number]]  (Normal) Collected: 03/29/24 0552    Specimen: Blood Updated: 03/29/24 0659     Phosphorus 3.2 mg/dL     Magnesium [[id number]]  (Normal) Collected: 03/29/24 0552    Specimen: Blood Updated: 03/29/24 0659     Magnesium 1.6 mg/dL     CBC & Differential [[id number]]  (Abnormal) Collected: 03/29/24 0552    Specimen: Blood Updated: 03/29/24 0640    Narrative:      The following orders were created for panel order CBC &  Differential.  Procedure                               Abnormality         Status                     ---------                               -----------         ------                     CBC Auto Differential[[id number]]        Abnormal            Final result                 Please view results for these tests on the individual orders.    CBC Auto Differential [[id number]]  (Abnormal) Collected: 03/29/24 0552    Specimen: Blood Updated: 03/29/24 0640     WBC 16.73 10*3/mm3      RBC 4.36 10*6/mm3      Hemoglobin 12.3 g/dL      Hematocrit 36.8 %      MCV 84.4 fL      MCH 28.2 pg      MCHC 33.4 g/dL      RDW 13.0 %      RDW-SD 39.8 fl      MPV 12.0 fL      Platelets 162 10*3/mm3      Neutrophil % 94.3 %      Lymphocyte % 2.3 %      Monocyte % 2.5 %      Eosinophil % 0.0 %      Basophil % 0.1 %      Immature Grans % 0.8 %      Neutrophils, Absolute 15.79 10*3/mm3      Lymphocytes, Absolute 0.38 10*3/mm3      Monocytes, Absolute 0.42 10*3/mm3      Eosinophils, Absolute 0.00 10*3/mm3      Basophils, Absolute 0.01 10*3/mm3      Immature Grans, Absolute 0.13 10*3/mm3      nRBC 0.0 /100 WBC     Body Fluid Culture - Body Fluid, Abdominal Wall [[id number]] Collected: 03/29/24 0030    Specimen: Body Fluid from Abdominal Wall Updated: 03/29/24 0129    [place] Draw [[id number]] Collected: 03/28/24 5893    Specimen: Blood Updated: 03/28/24 2201    Narrative:      The following orders were created for panel order [place] Draw.  Procedure                               Abnormality         Status                     ---------                               -----------         ------                     Green Top (Gel)[[id number]]                                  Final result               Lavender Top[[id number]]                                     Final result               Gold Top - SST[[id number]]                                   Final result               [place] Top[[id number]]                                         Final result                Light Blue Top[[id number]]                                   Final result                 Please view results for these tests on the individual orders.    [place] Top [[id number]] Collected: 03/28/24 1758    Specimen: Blood Updated: 03/28/24 2201     Extra Tube Hold for add-ons.     Comment: Auto resulted.       Urinalysis With Microscopic If Indicated (No Culture) - Urine, Clean Catch [[id number]]  (Abnormal) Collected: 03/28/24 2106    Specimen: Urine, Clean Catch Updated: 03/28/24 2118     Color, UA Yellow     Appearance, UA Clear     pH, UA <=5.0     Specific Gravity, UA >=1.030     Glucose, UA Negative     Ketones, UA Trace     Bilirubin, UA Negative     Blood, UA Negative     Protein, UA Negative     Leuk Esterase, UA Negative     Nitrite, UA Negative     Urobilinogen, UA 0.2 E.U./dL    Narrative:      Urine microscopic not indicated.    Blood Culture - Blood, Hand, Right [[id number]] Collected: 03/28/24 2042    Specimen: Blood from Hand, Right Updated: 03/28/24 2048    Blood Culture - Blood, Arm, Right [[id number]] Collected: 03/28/24 2042    Specimen: Blood from Arm, Right Updated: 03/28/24 2048    Lactic Acid, Plasma [[id number]]  (Normal) Collected: 03/28/24 1758    Specimen: Blood Updated: 03/28/24 2020     Lactate 1.2 mmol/L     Lavender Top [[id number]] Collected: 03/28/24 1758    Specimen: Blood Updated: 03/28/24 1901     Extra Tube hold for add-on     Comment: Auto resulted       Gold Top - SST [[id number]] Collected: 03/28/24 1758    Specimen: Blood Updated: 03/28/24 1901     Extra Tube Hold for add-ons.     Comment: Auto resulted.       Light Blue Top [[id number]] Collected: 03/28/24 1758    Specimen: Blood Updated: 03/28/24 1901     Extra Tube Hold for add-ons.     Comment: Auto resulted       Green Top (Gel) [[id number]] Collected: 03/28/24 1758    Specimen: Blood Updated: 03/28/24 1848     Extra Tube Hold for add-ons.     Comment: Auto resulted.       hCG, Serum, Qualitative [[id number]]  (Normal)  Collected: 03/28/24 1758    Specimen: Blood Updated: 03/28/24 1841     HCG Qualitative Negative    Comprehensive Metabolic Panel [[id number]]  (Abnormal) Collected: 03/28/24 1758    Specimen: Blood Updated: 03/28/24 1839     Glucose 105 mg/dL      BUN 11 mg/dL      Creatinine 0.85 mg/dL      Sodium 145 mmol/L      Potassium 4.3 mmol/L      Chloride 111 mmol/L      CO2 21.0 mmol/L      Calcium 9.1 mg/dL      Total Protein 6.7 g/dL      Albumin 4.5 g/dL      ALT (SGPT) 15 U/L      AST (SGOT) 15 U/L      Alkaline Phosphatase 71 U/L      Total Bilirubin 0.6 mg/dL      Globulin 2.2 gm/dL      A/G Ratio 2.0 g/dL      BUN/Creatinine Ratio 12.9     Anion Gap 13.0 mmol/L      eGFR 90.1 mL/min/1.73     Narrative:      GFR Normal >60  Chronic Kidney Disease <60  Kidney Failure <15      Lipase [[id number]]  (Normal) Collected: 03/28/24 1758    Specimen: Blood Updated: 03/28/24 1839     Lipase 29 U/L     Single High Sensitivity Troponin T [[id number]]  (Normal) Collected: 03/28/24 1758    Specimen: Blood Updated: 03/28/24 1839     HS Troponin T <6 ng/L     Narrative:      High Sensitive Troponin T Reference Range:  <14.0 ng/L- Negative Female for AMI  <22.0 ng/L- Negative Male for AMI  >=14 - Abnormal Female indicating possible myocardial injury.  >=22 - Abnormal Male indicating possible myocardial injury.   Clinicians would have to utilize clinical acumen, EKG, Troponin, and serial changes to determine if it is an Acute Myocardial Infarction or myocardial injury due to an underlying chronic condition.         CBC & Differential [[id number]]  (Abnormal) Collected: 03/28/24 1758    Specimen: Blood Updated: 03/28/24 1812    Narrative:      The following orders were created for panel order CBC & Differential.  Procedure                               Abnormality         Status                     ---------                               -----------         ------                     CBC Auto Differential[[id number]]        Abnormal             Final result                 Please view results for these tests on the individual orders.    CBC Auto Differential [[id number]]  (Abnormal) Collected: 03/28/24 1758    Specimen: Blood Updated: 03/28/24 1812     WBC 19.13 10*3/mm3      RBC 5.29 10*6/mm3      Hemoglobin 15.7 g/dL      Hematocrit 45.4 %      MCV 85.8 fL      MCH 29.7 pg      MCHC 34.6 g/dL      RDW 13.4 %      RDW-SD 42.1 fl      MPV 12.2 fL      Platelets 209 10*3/mm3      Neutrophil % 90.1 %      Lymphocyte % 5.8 %      Monocyte % 3.1 %      Eosinophil % 0.3 %      Basophil % 0.3 %      Immature Grans % 0.4 %      Neutrophils, Absolute 17.24 10*3/mm3      Lymphocytes, Absolute 1.10 10*3/mm3      Monocytes, Absolute 0.60 10*3/mm3      Eosinophils, Absolute 0.05 10*3/mm3      Basophils, Absolute 0.06 10*3/mm3      Immature Grans, Absolute 0.08 10*3/mm3      nRBC 0.0 /100 WBC             Assessment/Plan:    Perforated bowel    History of gastric bypass    Perforation of viscus    Leukocytosis      38 y.o. female postop day 0 status post laparoscopic repair of perforated gastrojejunostomy ulcer with omental patch.  Patient remains afebrile and hemodynamically stable with white blood cell count trending down.  Abdomen soft on exam the patient feeling better.  Will do ice chips today and plan for liquid diet tomorrow as long as she progresses.  Continue with ambulation, SCDs and incentive spirometer.  Continue IV antibiotics and await cultures.  Continue with PPI      Tate Whitley Jr., MD  Medical Director [facility]  [facility]-Bariatric Surgery    03/29/24

## 2024-03-29 NOTE — ANESTHESIA PROCEDURE NOTES
Airway  Urgency: elective    Date/Time: 3/28/2024 11:30 PM    General Information and Staff    Patient location during procedure: OR  CRNA/CAA: Rony Dickinson CRNA    Indications and Patient Condition  Indications for airway management: airway protection    Preoxygenated: yes  MILS maintained throughout  Mask difficulty assessment: 0 - not attempted    Final Airway Details  Final airway type: endotracheal airway      Successful airway: ETT  Cuffed: yes   Successful intubation technique: direct laryngoscopy and RSI  Facilitating devices/methods: cricoid pressure  Endotracheal tube insertion site: oral  Blade: Thalia  Blade size: 3  ETT size (mm): 7.0  Cormack-Lehane Classification: grade I - full view of glottis  Placement verified by: chest auscultation   Measured from: teeth  ETT/EBT  to teeth (cm): 22  Number of attempts at approach: 1  Assessment: lips, teeth, and gum same as pre-op and atraumatic intubation

## 2024-03-29 NOTE — ANESTHESIA POSTPROCEDURE EVALUATION
Patient: Radha Vargas    Procedure Summary       Date: 03/28/24 Room / Location: [facility] OR [address] [place] [address] MAIN OR    Anesthesia Start: 2320 Anesthesia Stop: 03/29/24 0049    Procedure: DIAGNOSTIC LAPAROSCOPY with laparoscopic closure of ulcer and corinne patch (Abdomen) Diagnosis:     Surgeons: Tate Whitley Jr., MD Provider: John Farias MD    Anesthesia Type: general ASA Status: 2 - Emergent            Anesthesia Type: general    Vitals  Vitals Value Taken Time   /84 03/29/24 0145   Temp 37.2 °C (98.9 °F) 03/29/24 0055   Pulse 110 03/29/24 0150   Resp 12 03/29/24 0115   SpO2 98 % 03/29/24 0150   Vitals shown include unfiled device data.        Post Anesthesia Care and Evaluation    Patient location during evaluation: bedside  Patient participation: complete - patient participated  Level of consciousness: awake  Pain management: adequate    Airway patency: patent  Anesthetic complications: No anesthetic complications  PONV Status: none  Cardiovascular status: acceptable  Respiratory status: acceptable  Hydration status: acceptable  Post Neuraxial Block status: Motor and sensory function returned to baseline

## 2024-03-29 NOTE — H&P
"    Patient Care Team:  Provider, No Known as PCP - General    Chief complaint abdominal pain    Subjective     Patient is a 38 y.o. female status post laparoscopic Donis-en-Y gastric bypass proximately 3 years ago in [facility] who is in [place] today on vacation started having abdominal pain this afternoon.  The pain in the left upper quadrant and then radiating to the epigastric region that got worse.  Patient presented to [facility] emergency room and underwent a CT scan the abdomen revealing free air and inflammation around the gastrojejunostomy.  Patient with elevated white count of 19,000.  Patient is afebrile and not tachycardic.  Patient describes the pain as a sharp stabbing type pain.  Patient denies fever chills shortness of air bowel or urinary problems..       Review of Systems   Pertinent items are noted in HPI, all other systems reviewed and negative    No past medical history on file.  No past surgical history on file.  No family history on file.     No medications prior to admission.     Allergies:  Penicillins and Sulfa antibiotics    Vital Signs  Temp:  [97.6 °F (36.4 °C)] 97.6 °F (36.4 °C)  Heart Rate:  [60-93] 93  Resp:  [20] 20  BP: (109-133)/(58-82) 133/71    Flowsheet Rows      Flowsheet Row First Filed Value   Admission Height 160 cm (63\") Documented at 03/28/2024 1741   Admission Weight 63.5 kg (140 lb) Documented at 03/28/2024 1741             Physical Exam:   GENERAL:alert, well appearing, and in no distress  HEENT: normochephalic, atraumatic, no scleral icterus  NECK: Supple;  no thyromegaly or lymphadenopathy  CARDIAC: regular rate     ABDOMEN: Soft and nondistended, tenderness greater in the upper abdomen; no rebound  EXTREMITIES: no cyanosis, clubbing   NEURO: alert and oriented x 3, normal speech,    SKIN: Moist, warm, no rashes.    Results Review:    I reviewed the patient's new clinical results.  I reviewed the patient's new imaging results and agree with the " interpretation.        * No active hospital problems. *      38-year-old female status post Donis-en-Y gastric bypass approximately 3 years ago with perforated bowel most likely perforated gastrojejunostomy ulcer.  Patient states that she had rotator cuff surgery back in December and took small amount of NSAIDs.  Patient also does vape.  We will proceed to the operating room for diagnostic laparoscopy possible open.  Patient did receive antibiotics in the emergency room and currently receiving them now.  Patient is getting IV fluids as well.  Patient not on any blood thinner medications.  The risks and benefits of the procedure were discussed with the patient in detail and all questions were answered.  Possibility of open, bleeding, infection, bowel injury, deep venous thrombosis, pulmonary embolism, incisional hernias, renal failure, myocardial infarction, respiratory and cardiac arrest and death were discussed. Consent will be signed and witnessed.    I discussed the patients findings and my recommendations with patient and nursing staff.     Tate Whitley MD  03/28/24  23:22 EDT    Time: Approximately 30 minutes was spent with the patient and over half that time was spent counseling.  All of the patients questions were answered.     Unable to speak

## 2024-03-29 NOTE — ANESTHESIA PREPROCEDURE EVALUATION
Anesthesia Evaluation     Patient summary reviewed   NPO Solid Status: > 8 hours             Airway   Mallampati: I  TM distance: >3 FB  Neck ROM: full  No difficulty expected  Dental - normal exam     Pulmonary    Cardiovascular - normal exam  Exercise tolerance: good (4-7 METS)        Neuro/Psych  GI/Hepatic/Renal/Endo      Musculoskeletal     Abdominal    Substance History      OB/GYN          Other        ROS/Med Hx Other: 38 Y with acute abdomen/ free air, s/p gastric bypass 3 years ago.               Anesthesia Plan    ASA 2 - emergent     general   Rapid sequence  intravenous induction     Anesthetic plan, risks, benefits, and alternatives have been provided, discussed and informed consent has been obtained with: patient.    CODE STATUS:

## 2024-03-29 NOTE — ED NOTES
Nursing report ED to floor  Radha Vargas  38 y.o.  female    HPI :  HPI (Adult)  Stated Reason for Visit: LLQ pain, radiates to shoulder  History Obtained From: EMS    Chief Complaint  Chief Complaint   Patient presents with    Abdominal Pain       Admitting doctor:   No admitting provider for patient encounter.    Admitting diagnosis:   There were no encounter diagnoses.    Code status:   Current Code Status       Date Active Code Status Order ID Comments User Context       Not on file            Allergies:   Penicillins and Sulfa antibiotics    Isolation:   No active isolations    Intake and Output    Intake/Output Summary (Last 24 hours) at 3/28/2024 2110  Last data filed at 3/28/2024 2110  Gross per 24 hour   Intake 1000 ml   Output --   Net 1000 ml       Weight:       03/28/24  1741   Weight: 63.5 kg (140 lb)       Most recent vitals:   Vitals:    03/28/24 2046 03/28/24 2048 03/28/24 2053 03/28/24 2055   BP:       Pulse: 79 79 86 72   Resp:       Temp:       SpO2: 97% 98% 97% 97%   Weight:       Height:           Active LDAs/IV Access:   Lines, Drains & Airways       Active LDAs       Name Placement date Placement time Site Days    Peripheral IV 03/28/24 1757 Left Antecubital 03/28/24 1757  Antecubital  less than 1                    Labs (abnormal labs have a star):   Labs Reviewed   COMPREHENSIVE METABOLIC PANEL - Abnormal; Notable for the following components:       Result Value    Glucose 105 (*)     Chloride 111 (*)     CO2 21.0 (*)     All other components within normal limits    Narrative:     GFR Normal >60  Chronic Kidney Disease <60  Kidney Failure <15     CBC WITH AUTO DIFFERENTIAL - Abnormal; Notable for the following components:    WBC 19.13 (*)     RBC 5.29 (*)     MPV 12.2 (*)     Neutrophil % 90.1 (*)     Lymphocyte % 5.8 (*)     Monocyte % 3.1 (*)     Neutrophils, Absolute 17.24 (*)     Immature Grans, Absolute 0.08 (*)     All other components within normal limits   LIPASE - Normal   HCG,  SERUM, QUALITATIVE - Normal   SINGLE HS TROPONIN T - Normal    Narrative:     High Sensitive Troponin T Reference Range:  <14.0 ng/L- Negative Female for AMI  <22.0 ng/L- Negative Male for AMI  >=14 - Abnormal Female indicating possible myocardial injury.  >=22 - Abnormal Male indicating possible myocardial injury.   Clinicians would have to utilize clinical acumen, EKG, Troponin, and serial changes to determine if it is an Acute Myocardial Infarction or myocardial injury due to an underlying chronic condition.        LACTIC ACID, PLASMA - Normal   BLOOD CULTURE   BLOOD CULTURE   RAINBOW DRAW    Narrative:     The following orders were created for panel order [place] Draw.  Procedure                               Abnormality         Status                     ---------                               -----------         ------                     Green Top (Gel)[[id number]]                                  Final result               Lavender Top[[id number]]                                     Final result               Gold Top - SST[[id number]]                                   Final result               Nickerson Top[[id number]]                                         In process                 Light Blue Top[[id number]]                                   Final result                 Please view results for these tests on the individual orders.   URINALYSIS W/ MICROSCOPIC IF INDICATED (NO CULTURE)   GREEN TOP   LAVENDER TOP   GOLD TOP - SST   LIGHT BLUE TOP   CBC AND DIFFERENTIAL    Narrative:     The following orders were created for panel order CBC & Differential.  Procedure                               Abnormality         Status                     ---------                               -----------         ------                     CBC Auto Differential[[id number]]        Abnormal            Final result                 Please view results for these tests on the individual orders.   GRAY TOP       EKG:   ECG 12 Lead  Other; neck pain   Preliminary Result   HEART RATE= 83  bpm   RR Interval= 723  ms   NH Interval= 179  ms   P Horizontal Axis= -36  deg   P Front Axis= 62  deg   QRSD Interval= 111  ms   QT Interval= 387  ms   QTcB= 455  ms   QRS Axis= -8  deg   T Wave Axis= 47  deg   - BORDERLINE ECG -   Sinus rhythm   Probable left atrial enlargement   RSR' in V1 or V2, right VCD or RVH   Electronically Signed By:    Date and Time of Study: 2024-03-28 18:29:49          Meds given in ED:   Medications   sodium chloride 0.9 % flush 10 mL (has no administration in time range)   sodium chloride 0.9 % flush 10 mL (has no administration in time range)   meropenem (MERREM) 1,000 mg in sodium chloride 0.9 % 100 mL MBP (1,000 mg Intravenous New Bag 3/28/24 2051)   Vancomycin HCl 1,250 mg in sodium chloride 0.9 % 250 mL VTB (has no administration in time range)   sodium chloride 0.9 % bolus 1,000 mL (0 mL Intravenous Stopped 3/28/24 2110)   ondansetron (ZOFRAN) injection 4 mg (4 mg Intravenous Given 3/28/24 1817)   morphine injection 4 mg (4 mg Intravenous Given 3/28/24 1816)   iopamidol (ISOVUE-300) 61 % injection 100 mL (85 mL Intravenous Given by Other 3/28/24 1937)   HYDROmorphone (DILAUDID) injection 0.5 mg (0.5 mg Intravenous Given 3/28/24 2007)       Imaging results:  XR Chest 1 View    Result Date: 3/28/2024  No evidence for active disease in the chest.  This report was finalized on 3/28/2024 7:08 PM by Dr. Jimmy Mann M.D on Workstation: [id number]       Ambulatory status:   - up with assist     Social issues:   Social History     Socioeconomic History    Marital status: Single       Peripheral Neurovascular  Peripheral Neurovascular (Adult)  Peripheral Neurovascular WDL: WDL    Neuro Cognitive  Neuro Cognitive (Adult)  Cognitive/Neuro/Behavioral WDL: WDL    Learning  Learning Assessment (Adult)  Learning Readiness and Ability: no barriers identified  Education Provided  Person Taught: patient    Respiratory  Respiratory  WDL  Respiratory WDL: WDL  Breath Sounds  Breath Sounds: All Fields  All Lung Fields Breath Sounds: Anterior:, Posterior:, equal bilaterally, clear    Abdominal Pain       Pain Assessments  Pain (Adult)  (0-10) Pain Rating: Rest: 7    NIH Stroke Scale       Galdino Groves RN  03/28/24 21:10 EDT

## 2024-03-29 NOTE — OP NOTE
Operation Note    Surgeon: Tate Whitley Jr., M.D.    Assistant: JENSEN Ba    Surgery assisted and facilitated by a certified physician assistant, who directly resulted in a decreased operative time, anesthetic time, wound exposure, and possibly of an operative wound infection, thereby decreasing patient morbidity and ultimately total expenditures. The surgical assistant assisted in placement of trochars and retraction during the procedure.  Surgical assistant also assisted and closing incisions.      Pre-Operative Diagnosis: Perforated gastrojejunostomy ulcer    Post-Operative Diagnosis: Post-Op Diagnosis Codes:  Same    Procedure Performed: Laparoscopic closure of perforated gastrojejunostomy ulcer with omental patch    Anesthesia:  GETA    Estimated Blood Loss: minimal    Fluids: 1000 mL crystalloid    Specimens: * No orders in the log *    Complications:  None    Indications: 38-year-old female status post laparoscopic Donis-en-Y gastric bypass in [facility] proximately 3 years ago who presented to [facility] emergency room with CT scan revealing free air and inflammation around the gastrojejunostomy.  Patient taken to the operating room for diagnostic laparoscopy.  Informed consent was obtained.    Procedure:     The patient was identified and after informed consent was obtained the patient was taken to the operating room and placed in the supine position.  Patient was given preoperative antibiotics and SCDs were placed by the nursing staff.  After adequate general anesthesia the abdomen was prepped with ChloraPrep and draped in the usual sterile fashion.  A transfer incision was made above into the left of the umbilicus with a scalpel blade using a 5 mm 0 degree laparoscope and a 5 mm Visiport trocar the abdomen was entered under direct visualization without difficulty and a pneumoperitoneum was established with good opening pressures.  General laparoscopic evaluation of the abdominal  cavity revealed no injury upon entry with the trocar.  There was purulent fluid noted in the upper abdomen as well as in the pelvis.  3 other 5 mm trocars placed under direct visualization.  2 on the right and 1 on the left.  Russ retractor was placed through the incision in the epigastric region.  The patient was then placed in reverse Trendelenburg.  The purulent fluid was suctioned up with the suction  and into a suction trap for specimen.  The specimen was sent off to pathology for Gram stain culture and sensitivity.  The left lobe of the liver was freed up before placing the Russ in which adhesions were taken down using the harmonic scalpel.  A perforation was noted just distal to the gastrojejunostomy the Donis limb side.  The perforation measured approximately 1 cm.  The Donis limb was freed of all adhesions as well as the omentum while lying adjacent to this.  The perforation was closed with 2-0 Vicryl sutures in interrupted fashion which were then tied.  A 36 VISI G bougie was placed by anesthesia into the gastric pouch.  The patient was placed in level position and the area was then submerged under saline.  The pouch and Donis limb were then insufflated with air and no air bubbles were seen.  The bougie was then placed in suction and then removed off suction.  2-0 silk sutures were placed in which omentum was brought up over the area and sutured down.  The abdomen was then thoroughly irrigated with 2 L of warm saline and suctioned.  All 4 quadrants were irrigated and washed out.  #19 Alexandre drain was then placed in the abdomen and brought up through the right lateral 5 mm trocar site and secured with a 2-0 nylon suture.  A drain was placed over the gastrojejunostomy underneath the left lobe of liver and around the spleen.  The trocars were then removed under direct visualization.  No bleeding was noted.  The pneumoperitoneum was desufflated.  The incisions were then closed with a 4-0  Monocryl in a subcuticular fashion.  Areas were then cleaned and dried and Exofin placed.  The patient tolerated the procedure well left the operating room in good condition.  Sponges and needles were counted and reported as correct.

## 2024-03-29 NOTE — PROGRESS NOTES
"[facility] Clinical Pharmacy Services: Meropenem Consult     Radha Vargas has a pharmacy consult to dose meropenem per Dr. Whitley's request.     Indication: intra-abdominal infection     Relevant clinical data and objective history reviewed:  38 y.o. female 160 cm (63\") 63.5 kg (140 lb)  Estimated Creatinine Clearance: 80.5 mL/min (by C-G formula based on SCr of 0.85 mg/dL).    No past medical history on file.  is allergic to penicillins and sulfa antibiotics.  Assessment/Plan    Will start patient on meropenem 500mg q6h over 3h extended infusion, which has been adjusted for any patient specific factors. Labs to be ordered: none . Pharmacy will continue to follow.     Lesvia Abebe, PharmD  Clinical Pharmacist    "

## 2024-03-29 NOTE — ED PROVIDER NOTES
EMERGENCY DEPARTMENT ENCOUNTER      PCP: Provider, No Known  Patient Care Team:  Provider, No Known as PCP - General   Independent Historians: Patient    HPI:  Chief Complaint: Abdominal pain  A complete HPI/ROS/PMH/PSH/SH/FH are unobtainable due to: None    Chronic or social conditions impacting patient care (social determinants of health): None    Context: Radha Vargas is a 38 y.o. female who presents to the ED c/o acute left-sided abdominal pain that began around 2:00 this afternoon.  Patient reports episode of vomiting last night but none today.  She reports bowel movement yesterday which was normal and nonbloody.  She denies diarrhea.  She denies dysuria, hematuria, shortness of breath, chest pain.  She also reports some left-sided neck pain that is slightly worse with movement.  Patient reports surgical history of gastric bypass approximately 3 years ago in [facility] by Dr. Hansen.  She reports hernia repair and cholecystectomy as well but denies any complications from her gastric bypass.    Review of prior external notes and/or external test results outside of this encounter: Encounter with primary care provider on 3/13/2024.  She has history of GERD.  Visit was for follow-up of ER encounter for palpitations.      PAST MEDICAL HISTORY  Active Ambulatory Problems     Diagnosis Date Noted    No Active Ambulatory Problems     Resolved Ambulatory Problems     Diagnosis Date Noted    No Resolved Ambulatory Problems     No Additional Past Medical History       The patient qualifies to receive the vaccine, but they have not yet received it.    PAST SURGICAL HISTORY  No past surgical history on file.      FAMILY HISTORY  No family history on file.      SOCIAL HISTORY  Social History     Socioeconomic History    Marital status: Single         ALLERGIES  Penicillins and Sulfa antibiotics        REVIEW OF SYSTEMS  Review of Systems   Constitutional:  Negative for fever.   Gastrointestinal:  Positive for  abdominal pain, nausea and vomiting (x1 last night). Negative for diarrhea.   Genitourinary:  Negative for dysuria.        All systems reviewed and negative except for those discussed in HPI.       PHYSICAL EXAM    I have reviewed the triage vital signs and nursing notes.    ED Triage Vitals [03/28/24 1741]   Temp Heart Rate Resp BP SpO2   97.6 °F (36.4 °C) 60 20 117/62 100 %      Temp src Heart Rate Source Patient Position BP Location FiO2 (%)   -- -- -- -- --       Physical Exam  GENERAL: alert, appears uncomfortable  SKIN: Warm, dry  HENT: Normocephalic, atraumatic  EYES: no scleral icterus  CV: regular rhythm, regular rate  RESPIRATORY: normal effort, lungs clear  ABDOMEN: soft, significant tenderness to left abdomen on palpation, nondistended  MUSCULOSKELETAL: no deformity  NEURO: alert, moves all extremities, follows commands          LAB RESULTS  Recent Results (from the past 24 hour(s))   Green Top (Gel)    Collection Time: 03/28/24  5:58 PM   Result Value Ref Range    Extra Tube Hold for add-ons.    Lavender Top    Collection Time: 03/28/24  5:58 PM   Result Value Ref Range    Extra Tube hold for add-on    Gold Top - SST    Collection Time: 03/28/24  5:58 PM   Result Value Ref Range    Extra Tube Hold for add-ons.    Gray Top    Collection Time: 03/28/24  5:58 PM   Result Value Ref Range    Extra Tube Hold for add-ons.    Light Blue Top    Collection Time: 03/28/24  5:58 PM   Result Value Ref Range    Extra Tube Hold for add-ons.    Comprehensive Metabolic Panel    Collection Time: 03/28/24  5:58 PM    Specimen: Blood   Result Value Ref Range    Glucose 105 (H) 65 - 99 mg/dL    BUN 11 6 - 20 mg/dL    Creatinine 0.85 0.57 - 1.00 mg/dL    Sodium 145 136 - 145 mmol/L    Potassium 4.3 3.5 - 5.2 mmol/L    Chloride 111 (H) 98 - 107 mmol/L    CO2 21.0 (L) 22.0 - 29.0 mmol/L    Calcium 9.1 8.6 - 10.5 mg/dL    Total Protein 6.7 6.0 - 8.5 g/dL    Albumin 4.5 3.5 - 5.2 g/dL    ALT (SGPT) 15 1 - 33 U/L    AST (SGOT) 15  1 - 32 U/L    Alkaline Phosphatase 71 39 - 117 U/L    Total Bilirubin 0.6 0.0 - 1.2 mg/dL    Globulin 2.2 gm/dL    A/G Ratio 2.0 g/dL    BUN/Creatinine Ratio 12.9 7.0 - 25.0    Anion Gap 13.0 5.0 - 15.0 mmol/L    eGFR 90.1 >60.0 mL/min/1.73   Lipase    Collection Time: 03/28/24  5:58 PM    Specimen: Blood   Result Value Ref Range    Lipase 29 13 - 60 U/L   hCG, Serum, Qualitative    Collection Time: 03/28/24  5:58 PM    Specimen: Blood   Result Value Ref Range    HCG Qualitative Negative Negative   Single High Sensitivity Troponin T    Collection Time: 03/28/24  5:58 PM    Specimen: Blood   Result Value Ref Range    HS Troponin T <6 <14 ng/L   CBC Auto Differential    Collection Time: 03/28/24  5:58 PM    Specimen: Blood   Result Value Ref Range    WBC 19.13 (H) 3.40 - 10.80 10*3/mm3    RBC 5.29 (H) 3.77 - 5.28 10*6/mm3    Hemoglobin 15.7 12.0 - 15.9 g/dL    Hematocrit 45.4 34.0 - 46.6 %    MCV 85.8 79.0 - 97.0 fL    MCH 29.7 26.6 - 33.0 pg    MCHC 34.6 31.5 - 35.7 g/dL    RDW 13.4 12.3 - 15.4 %    RDW-SD 42.1 37.0 - 54.0 fl    MPV 12.2 (H) 6.0 - 12.0 fL    Platelets 209 140 - 450 10*3/mm3    Neutrophil % 90.1 (H) 42.7 - 76.0 %    Lymphocyte % 5.8 (L) 19.6 - 45.3 %    Monocyte % 3.1 (L) 5.0 - 12.0 %    Eosinophil % 0.3 0.3 - 6.2 %    Basophil % 0.3 0.0 - 1.5 %    Immature Grans % 0.4 0.0 - 0.5 %    Neutrophils, Absolute 17.24 (H) 1.70 - 7.00 10*3/mm3    Lymphocytes, Absolute 1.10 0.70 - 3.10 10*3/mm3    Monocytes, Absolute 0.60 0.10 - 0.90 10*3/mm3    Eosinophils, Absolute 0.05 0.00 - 0.40 10*3/mm3    Basophils, Absolute 0.06 0.00 - 0.20 10*3/mm3    Immature Grans, Absolute 0.08 (H) 0.00 - 0.05 10*3/mm3    nRBC 0.0 0.0 - 0.2 /100 WBC   Lactic Acid, Plasma    Collection Time: 03/28/24  5:58 PM    Specimen: Blood   Result Value Ref Range    Lactate 1.2 0.5 - 2.0 mmol/L   ECG 12 Lead Other; neck pain    Collection Time: 03/28/24  6:29 PM   Result Value Ref Range    QT Interval 387 ms    QTC Interval 455 ms   Urinalysis  With Microscopic If Indicated (No Culture) - Urine, Clean Catch    Collection Time: 03/28/24  9:06 PM    Specimen: Urine, Clean Catch   Result Value Ref Range    Color, UA Yellow Yellow, Straw    Appearance, UA Clear Clear    pH, UA <=5.0 5.0 - 8.0    Specific Gravity, UA >=1.030 1.005 - 1.030    Glucose, UA Negative Negative    Ketones, UA Trace (A) Negative    Bilirubin, UA Negative Negative    Blood, UA Negative Negative    Protein, UA Negative Negative    Leuk Esterase, UA Negative Negative    Nitrite, UA Negative Negative    Urobilinogen, UA 0.2 E.U./dL 0.2 - 1.0 E.U./dL   Type & Screen    Collection Time: 03/28/24 10:40 PM    Specimen: Blood   Result Value Ref Range    ABO Type B     RH type Positive     Antibody Screen Negative     T&S Expiration Date 3/31/2024 11:59:59 PM        Ordered the above labs and independently reviewed and interpreted the results.        RADIOLOGY  CT Abdomen Pelvis With Contrast    Result Date: 3/28/2024  CT ABDOMEN PELVIS W CONTRAST-  Radiation dose reduction techniques were utilized, including automated exposure control and exposure modulation based on body size.  Clinical: Left-sided abdominal pain  COMPARISON: None  FINDINGS: 1. Upper abdominal postoperative change having the appearance of gastric bypass with gastrojejunostomy. Thick walled loops of jejunum demonstrated within the left abdomen consistent with enteritis. The most pronounced wall thickening appears to be just beyond the gastrojejunostomy site. There is a considerable amount of free intraperitoneal air within the upper abdomen. There are several gas bubbles seen within the left upper quadrant near the operative site. The findings are consistent with perforated viscus however the exact location cannot be with certainty. Possibly near the gastrojejunostomy. There is a small amount of free intraperitoneal fluid within the upper abdomen and pelvis. Attenuation compatible with serous fluid.  2. No biliary duct  dilatation status post cholecystectomy. The liver, pancreas, spleen, adrenal glands and kidneys have a normal appearance. No calculus or obstructive uropathy. Diameter of the aorta is normal. The urinary bladder is satisfactory in appearance.  3. Uterus and ovaries have a normal appearance. Remainder is unremarkable.  FINDINGS called directly to Samantha Cuellar at the time of completion, 8:00 p.m.    This report was finalized on 3/28/2024 8:02 PM by Dr. Gabriel Connor M.D on Workstation: [id number]      XR Chest 1 View    Result Date: 3/28/2024  CHEST SINGLE VIEW  HISTORY: Left shoulder and neck pain.  COMPARISON: None  FINDINGS: Cardiomediastinal silhouette is within normal limits. Lungs appear clear and there is no evidence for pulmonary edema or pleural effusion. Cardiac monitoring leads are present. There is mild elevation of the left hemidiaphragm.      No evidence for active disease in the chest.  This report was finalized on 3/28/2024 7:08 PM by Dr. Jimmy Mann M.D on Workstation: [id number]       I ordered the above noted radiological studies. Independently reviewed and interpreted by me.  See dictation for official radiology interpretation.      PROCEDURES    Procedures      MEDICATIONS GIVEN IN ER    Medications   sodium chloride 0.9 % flush 10 mL ( Intravenous MAR Hold 3/28/24 2304)   sodium chloride 0.9 % flush 10 mL ( Intravenous MAR Hold 3/28/24 2304)   lactated ringers infusion (9 mL/hr Intravenous New Bag 3/28/24 2306)   sodium chloride 0.9 % bolus 1,000 mL (0 mL Intravenous Stopped 3/28/24 2110)   ondansetron (ZOFRAN) injection 4 mg (4 mg Intravenous Given 3/28/24 1817)   morphine injection 4 mg (4 mg Intravenous Given 3/28/24 1816)   iopamidol (ISOVUE-300) 61 % injection 100 mL (85 mL Intravenous Given by Other 3/28/24 1937)   HYDROmorphone (DILAUDID) injection 0.5 mg (0.5 mg Intravenous Given 3/28/24 2007)   meropenem (MERREM) 1,000 mg in sodium chloride 0.9 % 100 mL MBP (1,000 mg Intravenous New  Bag 3/28/24 2051)   Vancomycin HCl 1,250 mg in sodium chloride 0.9 % 250 mL VTB ( Intravenous Currently Infusing 3/28/24 2311)   HYDROmorphone (DILAUDID) injection 1 mg (1 mg Intravenous Given 3/28/24 2242)   famotidine (PEPCID) injection 20 mg (20 mg Intravenous Given 3/28/24 2306)         PROGRESS, DATA ANALYSIS, CONSULTS, AND MEDICAL DECISION MAKING    All labs have been independently reviewed and interpreted by me.  All radiology studies have been independently reviewed and interpreted by me and discussed with radiologist dictating the report.   EKG's independently reviewed and interpreted by me.  Discussion below represents my analysis of pertinent findings related to patient's condition, differential diagnosis, treatment plan and final disposition.    My differential diagnosis for abdominal pain includes but is not limited to:    Gastritis, gastroenteritis, peptic ulcer disease, GERD, esophageal perforation, acute appendicitis, mesenteric adenitis, Meckel’s diverticulum, epiploic appendagitis, diverticulitis, colon cancer, ulcerative colitis, Crohn’s disease, intussusception, small bowel obstruction, adhesions, ischemic bowel, perforated viscus, ileus, obstipation, biliary colic, cholecystitis, cholelithiasis, Frantz-Jason Timbo, hepatitis, pancreatitis, common bile duct obstruction, cholangitis, bile leak, splenic trauma, splenic rupture, splenic infarction, splenic abscess, abdominal abscess, ascites, spontaneous bacterial peritonitis, hernia, UTI, cystitis, ureterolithiasis, urinary obstruction, ovarian cyst, torsion, pregnancy, ectopic pregnancy, PID, pelvic abscess, mittelschmerz, endometriosis, AAA, myocardial infarction, pneumonia, cancer, porphyria, DKA, medications, sickle cell, viral syndrome, herpes zoster      ED Course as of 03/28/24 2357   Thu Mar 28, 2024   1833 EKG interpreted by me demonstrates sinus rhythm, rate 83, no [place]/QT prolongation, no ST elevation [MW]   1848 Lipase: 29 [DC]   1848  WBC(!): 19.13 [DC]   1848 Hemoglobin: 15.7 [DC]   1848 Platelets: 209 [DC]   1848 Creatinine: 0.85 [DC]   1848 Sodium: 145 [DC]   1848 Potassium: 4.3 [DC]   1848 CO2(!): 21.0 [DC]   1848 HS Troponin T: <6 [DC]   2002 Discussed CT with radiologist, Dr. Connor, who reports free air, possibly gastric in origin is noted. Pt has abnormal anatomy secondary to surgical history. No obvious diverticulitis. [DC]   2007 Amoxicillin caused him to swell.  Will give patient meropenem and vancomycin per pharmacy recommendation.  [DC]   2007 Discussed results with patient and plan for antibiotics and admission.  He reports gastric bypass was performed by Dr. Hansen in [place] about 3 years ago.  She did have a hernia repair performed after the surgery but no other reported complications. [DC]   2039 Discussed case with Dr. Colon, surgery, who recommends consult with bariatric surgery for possible complication from previous gastric bypass. [DC]   2040 Lactate: 1.2 [DC]   2055 Discussed case with Dr. Whitley, bariatric surgery, who recommends discussing with patient's surgeon and transfer to [place] for care. [DC]   2155 Repaged bariatric surgery, Dr. Grace. [DC]   2231 We have paged Dr. Grace's surgical group on-call 4 times but have not received return call.  Repaged Dr. Whitley and discussed situation.  He will come in and recommends taking patient to the OR at this time.  Discussed plan with patient who is agreeable. [DC]      ED Course User Index  [DC] Samantha Cuellar PA  [MW] Demetri Brunner MD             AS OF 23:57 EDT VITALS:    BP - 133/71  HR - 93  TEMP - 97.6 °F (36.4 °C)  O2 SATS - 100%        DIAGNOSIS  Final diagnoses:   Perforation of viscus   History of gastric bypass   Leukocytosis, unspecified type         DISPOSITION  ED Disposition       ED Disposition   Send to OR    Condition   --    Comment   --                  Note Disclaimer: At [facility], we believe that sharing information builds trust  and better relationships. You are receiving this note because you recently visited [facility]. It is possible you will see health information before a provider has talked with you about it. This kind of information can be easy to misunderstand. To help you fully understand what it means for your health, we urge you to discuss this note with your provider.         Samantha Cuellar PA  03/28/24 2618

## 2024-03-30 LAB
ANION GAP SERPL CALCULATED.3IONS-SCNC: 6.4 MMOL/L (ref 5–15)
BUN SERPL-MCNC: 9 MG/DL (ref 6–20)
BUN/CREAT SERPL: 15 (ref 7–25)
CALCIUM SPEC-SCNC: 8.6 MG/DL (ref 8.6–10.5)
CHLORIDE SERPL-SCNC: 107 MMOL/L (ref 98–107)
CO2 SERPL-SCNC: 24.6 MMOL/L (ref 22–29)
CREAT SERPL-MCNC: 0.6 MG/DL (ref 0.57–1)
CRP SERPL-MCNC: 24.13 MG/DL (ref 0–0.5)
DEPRECATED RDW RBC AUTO: 40 FL (ref 37–54)
EGFRCR SERPLBLD CKD-EPI 2021: 118 ML/MIN/1.73
ERYTHROCYTE [DISTWIDTH] IN BLOOD BY AUTOMATED COUNT: 13 % (ref 12.3–15.4)
GLUCOSE SERPL-MCNC: 101 MG/DL (ref 65–99)
HCT VFR BLD AUTO: 30 % (ref 34–46.6)
HGB BLD-MCNC: 10.4 G/DL (ref 12–15.9)
MCH RBC QN AUTO: 29.1 PG (ref 26.6–33)
MCHC RBC AUTO-ENTMCNC: 34.7 G/DL (ref 31.5–35.7)
MCV RBC AUTO: 84 FL (ref 79–97)
PLATELET # BLD AUTO: 121 10*3/MM3 (ref 140–450)
PMV BLD AUTO: 11.7 FL (ref 6–12)
POTASSIUM SERPL-SCNC: 4.3 MMOL/L (ref 3.5–5.2)
RBC # BLD AUTO: 3.57 10*6/MM3 (ref 3.77–5.28)
SODIUM SERPL-SCNC: 138 MMOL/L (ref 136–145)
WBC NRBC COR # BLD AUTO: 9.21 10*3/MM3 (ref 3.4–10.8)

## 2024-03-30 PROCEDURE — 25010000002 CEFTRIAXONE PER 250 MG: Performed by: INTERNAL MEDICINE

## 2024-03-30 PROCEDURE — 25010000002 HYDROMORPHONE PER 4 MG: Performed by: SURGERY

## 2024-03-30 PROCEDURE — 99223 1ST HOSP IP/OBS HIGH 75: CPT | Performed by: INTERNAL MEDICINE

## 2024-03-30 PROCEDURE — 80048 BASIC METABOLIC PNL TOTAL CA: CPT | Performed by: SURGERY

## 2024-03-30 PROCEDURE — 25010000002 METOCLOPRAMIDE PER 10 MG: Performed by: SURGERY

## 2024-03-30 PROCEDURE — 25010000002 THIAMINE HCL 200 MG/2ML SOLUTION 2 ML VIAL: Performed by: SURGERY

## 2024-03-30 PROCEDURE — 85027 COMPLETE CBC AUTOMATED: CPT | Performed by: SURGERY

## 2024-03-30 PROCEDURE — 25010000002 FLUCONAZOLE PER 200 MG: Performed by: SURGERY

## 2024-03-30 PROCEDURE — 25010000002 CYANOCOBALAMIN PER 1000 MCG: Performed by: SURGERY

## 2024-03-30 PROCEDURE — 25010000002 METRONIDAZOLE 500 MG/100ML SOLUTION: Performed by: INTERNAL MEDICINE

## 2024-03-30 PROCEDURE — 25010000002 ENOXAPARIN PER 10 MG: Performed by: SURGERY

## 2024-03-30 PROCEDURE — 86140 C-REACTIVE PROTEIN: CPT | Performed by: INTERNAL MEDICINE

## 2024-03-30 PROCEDURE — 25810000003 SODIUM CHLORIDE 0.9 % SOLUTION 1,000 ML FLEX CONT: Performed by: SURGERY

## 2024-03-30 PROCEDURE — 25010000002 MEROPENEM PER 100 MG: Performed by: SURGERY

## 2024-03-30 PROCEDURE — 25810000003 LACTATED RINGERS PER 1000 ML: Performed by: SURGERY

## 2024-03-30 RX ORDER — ENOXAPARIN SODIUM 100 MG/ML
40 INJECTION SUBCUTANEOUS EVERY 24 HOURS
Status: DISCONTINUED | OUTPATIENT
Start: 2024-03-30 | End: 2024-04-01 | Stop reason: HOSPADM

## 2024-03-30 RX ORDER — METRONIDAZOLE 500 MG/100ML
500 INJECTION, SOLUTION INTRAVENOUS EVERY 8 HOURS
Status: DISCONTINUED | OUTPATIENT
Start: 2024-03-30 | End: 2024-04-01 | Stop reason: HOSPADM

## 2024-03-30 RX ORDER — FLUCONAZOLE 2 MG/ML
400 INJECTION, SOLUTION INTRAVENOUS EVERY 24 HOURS
Status: COMPLETED | OUTPATIENT
Start: 2024-03-31 | End: 2024-04-01

## 2024-03-30 RX ORDER — ACETAMINOPHEN 325 MG/1
650 TABLET ORAL EVERY 6 HOURS PRN
Status: DISCONTINUED | OUTPATIENT
Start: 2024-03-30 | End: 2024-03-30

## 2024-03-30 RX ORDER — ACETAMINOPHEN 325 MG/1
650 TABLET ORAL EVERY 4 HOURS PRN
Status: DISCONTINUED | OUTPATIENT
Start: 2024-03-30 | End: 2024-04-01 | Stop reason: HOSPADM

## 2024-03-30 RX ORDER — HYDROMORPHONE HYDROCHLORIDE 4 MG/1
2 TABLET ORAL EVERY 4 HOURS PRN
Status: DISCONTINUED | OUTPATIENT
Start: 2024-03-30 | End: 2024-04-01 | Stop reason: HOSPADM

## 2024-03-30 RX ADMIN — HYDROMORPHONE HYDROCHLORIDE 0.5 MG: 1 INJECTION, SOLUTION INTRAMUSCULAR; INTRAVENOUS; SUBCUTANEOUS at 10:37

## 2024-03-30 RX ADMIN — SODIUM CHLORIDE, POTASSIUM CHLORIDE, SODIUM LACTATE AND CALCIUM CHLORIDE 100 ML/HR: 600; 310; 30; 20 INJECTION, SOLUTION INTRAVENOUS at 22:10

## 2024-03-30 RX ADMIN — METOCLOPRAMIDE 10 MG: 5 INJECTION, SOLUTION INTRAMUSCULAR; INTRAVENOUS at 17:01

## 2024-03-30 RX ADMIN — SODIUM CHLORIDE, POTASSIUM CHLORIDE, SODIUM LACTATE AND CALCIUM CHLORIDE 100 ML/HR: 600; 310; 30; 20 INJECTION, SOLUTION INTRAVENOUS at 13:00

## 2024-03-30 RX ADMIN — MEROPENEM 500 MG: 500 INJECTION, POWDER, FOR SOLUTION INTRAVENOUS at 10:37

## 2024-03-30 RX ADMIN — HYDROMORPHONE HYDROCHLORIDE 0.5 MG: 1 INJECTION, SOLUTION INTRAMUSCULAR; INTRAVENOUS; SUBCUTANEOUS at 07:37

## 2024-03-30 RX ADMIN — FLUCONAZOLE 200 MG: 200 INJECTION, SOLUTION INTRAVENOUS at 08:37

## 2024-03-30 RX ADMIN — MEROPENEM 500 MG: 500 INJECTION, POWDER, FOR SOLUTION INTRAVENOUS at 05:03

## 2024-03-30 RX ADMIN — METOCLOPRAMIDE 10 MG: 5 INJECTION, SOLUTION INTRAMUSCULAR; INTRAVENOUS at 10:37

## 2024-03-30 RX ADMIN — METOCLOPRAMIDE 10 MG: 5 INJECTION, SOLUTION INTRAMUSCULAR; INTRAVENOUS at 05:03

## 2024-03-30 RX ADMIN — HYDROMORPHONE HYDROCHLORIDE 2 MG: 4 TABLET ORAL at 12:58

## 2024-03-30 RX ADMIN — HYDROMORPHONE HYDROCHLORIDE 2 MG: 4 TABLET ORAL at 21:14

## 2024-03-30 RX ADMIN — HYDROMORPHONE HYDROCHLORIDE 0.5 MG: 1 INJECTION, SOLUTION INTRAMUSCULAR; INTRAVENOUS; SUBCUTANEOUS at 02:23

## 2024-03-30 RX ADMIN — METRONIDAZOLE 500 MG: 500 INJECTION, SOLUTION INTRAVENOUS at 22:09

## 2024-03-30 RX ADMIN — METOCLOPRAMIDE 10 MG: 5 INJECTION, SOLUTION INTRAMUSCULAR; INTRAVENOUS at 22:10

## 2024-03-30 RX ADMIN — HYDROMORPHONE HYDROCHLORIDE 0.5 MG: 1 INJECTION, SOLUTION INTRAMUSCULAR; INTRAVENOUS; SUBCUTANEOUS at 00:20

## 2024-03-30 RX ADMIN — SODIUM CHLORIDE, POTASSIUM CHLORIDE, SODIUM LACTATE AND CALCIUM CHLORIDE 150 ML/HR: 600; 310; 30; 20 INJECTION, SOLUTION INTRAVENOUS at 05:03

## 2024-03-30 RX ADMIN — THIAMINE HYDROCHLORIDE 250 ML/HR: 100 INJECTION, SOLUTION INTRAMUSCULAR; INTRAVENOUS at 00:59

## 2024-03-30 RX ADMIN — PANTOPRAZOLE SODIUM 40 MG: 40 INJECTION, POWDER, FOR SOLUTION INTRAVENOUS at 08:36

## 2024-03-30 RX ADMIN — ACETAMINOPHEN 325MG 650 MG: 325 TABLET ORAL at 21:14

## 2024-03-30 RX ADMIN — ACETAMINOPHEN 325MG 650 MG: 325 TABLET ORAL at 17:01

## 2024-03-30 RX ADMIN — CEFTRIAXONE 2000 MG: 2 INJECTION, POWDER, FOR SOLUTION INTRAMUSCULAR; INTRAVENOUS at 14:21

## 2024-03-30 RX ADMIN — ACETAMINOPHEN 325MG 650 MG: 325 TABLET ORAL at 12:58

## 2024-03-30 RX ADMIN — HYDROMORPHONE HYDROCHLORIDE 2 MG: 4 TABLET ORAL at 17:06

## 2024-03-30 RX ADMIN — HYDROMORPHONE HYDROCHLORIDE 0.5 MG: 1 INJECTION, SOLUTION INTRAMUSCULAR; INTRAVENOUS; SUBCUTANEOUS at 04:26

## 2024-03-30 RX ADMIN — ENOXAPARIN SODIUM 40 MG: 100 INJECTION SUBCUTANEOUS at 10:37

## 2024-03-30 RX ADMIN — CYANOCOBALAMIN 1000 MCG: 1000 INJECTION, SOLUTION INTRAMUSCULAR; SUBCUTANEOUS at 08:37

## 2024-03-30 RX ADMIN — PANTOPRAZOLE SODIUM 40 MG: 40 INJECTION, POWDER, FOR SOLUTION INTRAVENOUS at 21:14

## 2024-03-30 NOTE — CONSULTS
"Referring Provider: Tate Whitley Jr., MD  [address]  [address] [address]    Reason for Consultation: ABX management    History of present illness:  Radha Vargas is a 38 y.o. who I am asked to evaluate and give opinion for \"ABX management\". History is obtained from the patient and review of the old medical records which I summarize/synthesize as follows: About 3 years ago she underwent laparoscopic Donis-en-Y gastric bypass in [place].  She says that once in the past she had sepsis associated with an abscess in this area.  She and her boyfriend are currently visiting here on to get away from [place], [place].  Unfortunately for her she presented to our emergency room on 3/28/2024 with sharp left lower quadrant pain and abdominal cramping.  This was rather sudden in onset.  No associated fever.  No alleviating factors.    In the emergency room, she was afebrile with a normal heart rate and blood pressure.  Labs notable for WBC 19, lactate 1.2, and lipase 29.  She had a CT scan done that showed enteritis and free enter peritoneal air in the upper abdomen with gas bubbles present at the operative site.  All of this was concerning for perforated viscus.  She was started on meropenem and fluconazole.    Therefore on 3/28/2024 she underwent laparoscopic closure of perforated gastrojejunostomy ulcer with omental patch.    I reviewed the bariatric surgeons note.  She is improving.  Her WBC has normalized.  She is afebrile and on room air.  It looks like an abdominal wall body fluid culture was sent yesterday and is showing rare amounts of coagulase-negative staph and rare amounts of alphahemolytic Streptococcus.  It is unclear where this was sent from.  It was possibly sent from her drain.      Past Surgical History:   Procedure Laterality Date    ABDOMINAL SURGERY      DIAGNOSTIC LAPAROSCOPY N/A 3/28/2024    Procedure: DIAGNOSTIC LAPAROSCOPY with laparoscopic closure of ulcer and corinne " patch;  Surgeon: Tate Whitley Jr., MD;  Location: [facility] OR;  Service: General;  Laterality: N/A;    HERNIA REPAIR         Social History:  + vaping  Lives in [place], [place]    Antibiotic allergies and intolerances:    Penicillin  2.  Sulfa  Both caused hives    Medications:    Current Facility-Administered Medications:     albuterol (PROVENTIL) nebulizer solution 0.083% 2.5 mg/3mL, 2.5 mg, Nebulization, Q4H PRN, Tate Whitley Jr., MD    amisulpride (antiemetic) (BARHEMSYS) injection 10 mg, 10 mg, Intravenous, Once PRN, Tate Whitley Jr., MD    diphenhydrAMINE (BENADRYL) injection 25 mg, 25 mg, Intravenous, Q4H PRN, Tate Whitley Jr., MD    Enoxaparin Sodium (LOVENOX) syringe 40 mg, 40 mg, Subcutaneous, Q24H, Tate Whitley Jr., MD, 40 mg at 03/30/24 1037    fluconazole (DIFLUCAN) IVPB 200 mg, 200 mg, Intravenous, Q24H, Tate Whitley Jr., MD, Last Rate: 100 mL/hr at 03/30/24 0837, 200 mg at 03/30/24 0837    hydrALAZINE (APRESOLINE) injection 10 mg, 10 mg, Intravenous, Q30 Min PRN, Tate Whitley Jr., MD    HYDROmorphone (DILAUDID) injection 0.5 mg, 0.5 mg, Intravenous, Q2H PRN, 0.5 mg at 03/30/24 1037 **AND** naloxone (NARCAN) injection 0.1 mg, 0.1 mg, Intravenous, Q5 Min PRN, Tate Whitley Jr., MD    lactated ringers infusion, 100 mL/hr, Intravenous, Continuous, Tate Whitley Jr., MD, Last Rate: 100 mL/hr at 03/30/24 1037, 100 mL/hr at 03/30/24 1037    meropenem (MERREM) 500 mg in sodium chloride 0.9 % 100 mL MBP, 500 mg, Intravenous, Q6H, Tate Whitley Jr., MD, 500 mg at 03/30/24 1037    metoclopramide (REGLAN) injection 10 mg, 10 mg, Intravenous, Q6H, Tate Whitley Jr., MD, 10 mg at 03/30/24 1037    ondansetron ODT (ZOFRAN-ODT) disintegrating tablet 4 mg, 4 mg, Oral, Q4H PRN **OR** ondansetron (ZOFRAN) injection 4 mg, 4 mg, Intravenous, Q4H PRN, Tate Whitley Jr., MD    pantoprazole (PROTONIX) injection 40 mg, 40 mg, Intravenous,  "Q12H, Tate Whitley Jr., MD, 40 mg at 03/30/24 0836    phenol (CHLORASEPTIC) 1.4 % liquid 1 spray, 1 spray, Mouth/Throat, Q2H PRN, Tate Whitley Jr., MD    prochlorperazine (COMPAZINE) injection 10 mg, 10 mg, Intravenous, Q6H PRN, Tate Whitley Jr., MD    promethazine (PHENERGAN) tablet 12.5 mg, 12.5 mg, Oral, Q4H PRN **OR** promethazine (PHENERGAN) suppository 12.5 mg, 12.5 mg, Rectal, Q4H PRN, Tate Whitley Jr., MD      Objective   Vital Signs   Temp:  [97.5 °F (36.4 °C)-98.8 °F (37.1 °C)] 98.8 °F (37.1 °C)  Heart Rate:  [66-86] 75  Resp:  [16] 16  BP: ()/(51-61) 98/61    Physical Exam:   General: awake, clearly does not feel well  Eyes: no scleral icterus  ENT: no thrush  Cardiovascular: Normal rate  Respiratory: normal work of breathing on room air  GI: Abdomen bandaged with MARVEL drain present  :  no Rodgers catheter  Skin: No rashes  Neurological: Alert and oriented x 3  Psychiatric: Normal mood and affect   Vasc: PIV w/o erythema    Labs:     Lab Results   Component Value Date    WBC 9.21 03/30/2024    HGB 10.4 (L) 03/30/2024    HCT 30.0 (L) 03/30/2024    MCV 84.0 03/30/2024     (L) 03/30/2024       Lab Results   Component Value Date    GLUCOSE 101 (H) 03/30/2024    BUN 9 03/30/2024    CREATININE 0.60 03/30/2024    BCR 15.0 03/30/2024    CO2 24.6 03/30/2024    CALCIUM 8.6 03/30/2024    ALBUMIN 3.7 03/29/2024    AST 50 (H) 03/29/2024    ALT 67 (H) 03/29/2024     No results found for: \"CRP\"    Microbiology:  3/28 BCx: Negative to date  3/29 abdominal wall culture: Rare amounts of coagulase-negative staph and alphahemolytic Streptococcus    Radiology:  CT abdomen/pelvis with findings consistent with perforated viscus    CXR personally reviewed and is negative for pneumonia      ASSESSMENT/PLAN:  Perforated viscus  Leukocytosis  History of Donis-en-Y gastric bypass surgery  Amoxicillin allergy    Thanks to bariatric surgeon for his prompt intervention to help provide source " control.  I recommend that we adjust her antibiotics to ceftriaxone 2 g IV every 24 hours, Flagyl 500 mg IV every 8 hours, and fluconazole 400 mg IV every 24 hours with the duration to be determined.  I will check a baseline CRP.  It is good to see that her WBC has normalized.  She is hoping she can eat more soon and work towards getting out of the hospital in the coming days.    Check CBC and CMP in the a.m. for close monitoring while on broad-spectrum antibiotics.    ID will follow. D/w RN re: plan.

## 2024-03-30 NOTE — PLAN OF CARE
Goal Outcome Evaluation:  Plan of Care Reviewed With: patient        Progress: improving  Outcome Evaluation: Pt A&Ox4. VSS. Pt awake most of the night. Reports pain tolerable at rest but increases steeply with movement. Pt ambulating without difficulty to BR. Denies nausea. MARVEL drain output increased as shift progressed and became more serous.

## 2024-03-30 NOTE — PROGRESS NOTES
LOS: 0 days   Patient Care Team:  Provider, No Known as PCP - General    Chief Complaint: Headache    Interval History:   Patient Denies: Nausea, vomiting, chest pain, shortness of air, lower extremity pain  Patient states she is feeling okay except for headache this morning      Vital Signs  Temp:  [97.5 °F (36.4 °C)-98.8 °F (37.1 °C)] 98.8 °F (37.1 °C)  Heart Rate:  [66-86] 75  Resp:  [16] 16  BP: ()/(51-61) 98/61      Physical Exam:   Heart: RR   Abd: Soft and nondistended; incisions   Ext:  No clubbing, cyanosis     Results Review:     I reviewed the patient's new clinical results.    Lab Results (last 24 hours)       Procedure Component Value Units Date/Time    Basic Metabolic Panel [[id number]]  (Abnormal) Collected: 03/30/24 0832    Specimen: Blood Updated: 03/30/24 0902     Glucose 101 mg/dL      BUN 9 mg/dL      Creatinine 0.60 mg/dL      Sodium 138 mmol/L      Potassium 4.3 mmol/L      Chloride 107 mmol/L      CO2 24.6 mmol/L      Calcium 8.6 mg/dL      BUN/Creatinine Ratio 15.0     Anion Gap 6.4 mmol/L      eGFR 118.0 mL/min/1.73     Narrative:      GFR Normal >60  Chronic Kidney Disease <60  Kidney Failure <15      CBC (No Diff) [[id number]]  (Abnormal) Collected: 03/30/24 0832    Specimen: Blood Updated: 03/30/24 0845     WBC 9.21 10*3/mm3      RBC 3.57 10*6/mm3      Hemoglobin 10.4 g/dL      Hematocrit 30.0 %      MCV 84.0 fL      MCH 29.1 pg      MCHC 34.7 g/dL      RDW 13.0 %      RDW-SD 40.0 fl      MPV 11.7 fL      Platelets 121 10*3/mm3     Blood Culture - Blood, Arm, Right [[id number]]  (Normal) Collected: 03/28/24 2042    Specimen: Blood from Arm, Right Updated: 03/29/24 2100     Blood Culture No growth at 24 hours    Blood Culture - Blood, Hand, Right [[id number]]  (Normal) Collected: 03/28/24 2042    Specimen: Blood from Hand, Right Updated: 03/29/24 2100     Blood Culture No growth at 24 hours    Body Fluid Culture - Body Fluid, Abdominal Wall [[id number]] Collected: 03/29/24 0030     Specimen: Body Fluid from Abdominal Wall Updated: 03/29/24 8071     Body Fluid Culture Abnormal Stain     Gram Stain Moderate (3+) WBCs per low power field      Few (2+) Gram positive cocci in pairs and clusters            Assessment/Plan:    Perforated bowel    History of gastric bypass    Perforation of viscus    Leukocytosis      38 y.o. female postop day 1 status post laparoscopic closure of gastrojejunostomy perforation who is doing well.  Patient remains afebrile and hemodynamically stable and abdomen soft on exam.  White blood cell count normalized today.  Start on bariatric stage I diet today.  Will consult infectious disease for antibiotic choice and duration.  Encourage increase ambulation and incentive spirometer.  Continue with PPI.  Continue with SCDs and will add Lovenox for DVT prophylaxis.      Tate Whitley Jr., MD  Medical Director [facility]  [facility]ric [facility]    03/30/24

## 2024-03-31 LAB
ALBUMIN SERPL-MCNC: 3 G/DL (ref 3.5–5.2)
ALBUMIN/GLOB SERPL: 1.5 G/DL
ALP SERPL-CCNC: 83 U/L (ref 39–117)
ALT SERPL W P-5'-P-CCNC: 35 U/L (ref 1–33)
ANION GAP SERPL CALCULATED.3IONS-SCNC: 10 MMOL/L (ref 5–15)
AST SERPL-CCNC: 23 U/L (ref 1–32)
BACTERIA FLD CULT: ABNORMAL
BACTERIA FLD CULT: ABNORMAL
BILIRUB SERPL-MCNC: 0.6 MG/DL (ref 0–1.2)
BUN SERPL-MCNC: 6 MG/DL (ref 6–20)
BUN/CREAT SERPL: 10.3 (ref 7–25)
CALCIUM SPEC-SCNC: 7.9 MG/DL (ref 8.6–10.5)
CHLORIDE SERPL-SCNC: 104 MMOL/L (ref 98–107)
CO2 SERPL-SCNC: 24 MMOL/L (ref 22–29)
CREAT SERPL-MCNC: 0.58 MG/DL (ref 0.57–1)
DEPRECATED RDW RBC AUTO: 40.8 FL (ref 37–54)
EGFRCR SERPLBLD CKD-EPI 2021: 119 ML/MIN/1.73
ERYTHROCYTE [DISTWIDTH] IN BLOOD BY AUTOMATED COUNT: 13 % (ref 12.3–15.4)
GLOBULIN UR ELPH-MCNC: 2 GM/DL
GLUCOSE SERPL-MCNC: 86 MG/DL (ref 65–99)
GRAM STN SPEC: ABNORMAL
GRAM STN SPEC: ABNORMAL
HCT VFR BLD AUTO: 29.2 % (ref 34–46.6)
HGB BLD-MCNC: 9.9 G/DL (ref 12–15.9)
MCH RBC QN AUTO: 29.3 PG (ref 26.6–33)
MCHC RBC AUTO-ENTMCNC: 33.9 G/DL (ref 31.5–35.7)
MCV RBC AUTO: 86.4 FL (ref 79–97)
PLATELET # BLD AUTO: 113 10*3/MM3 (ref 140–450)
PMV BLD AUTO: 12 FL (ref 6–12)
POTASSIUM SERPL-SCNC: 3.4 MMOL/L (ref 3.5–5.2)
PROT SERPL-MCNC: 5 G/DL (ref 6–8.5)
RBC # BLD AUTO: 3.38 10*6/MM3 (ref 3.77–5.28)
SODIUM SERPL-SCNC: 138 MMOL/L (ref 136–145)
WBC NRBC COR # BLD AUTO: 5.48 10*3/MM3 (ref 3.4–10.8)

## 2024-03-31 PROCEDURE — 25010000002 METRONIDAZOLE 500 MG/100ML SOLUTION: Performed by: INTERNAL MEDICINE

## 2024-03-31 PROCEDURE — 25010000002 FLUCONAZOLE PER 200 MG: Performed by: INTERNAL MEDICINE

## 2024-03-31 PROCEDURE — 25010000002 CEFTRIAXONE PER 250 MG: Performed by: INTERNAL MEDICINE

## 2024-03-31 PROCEDURE — 85027 COMPLETE CBC AUTOMATED: CPT | Performed by: INTERNAL MEDICINE

## 2024-03-31 PROCEDURE — 25010000002 METOCLOPRAMIDE PER 10 MG: Performed by: SURGERY

## 2024-03-31 PROCEDURE — 80053 COMPREHEN METABOLIC PANEL: CPT | Performed by: INTERNAL MEDICINE

## 2024-03-31 PROCEDURE — 99232 SBSQ HOSP IP/OBS MODERATE 35: CPT | Performed by: INTERNAL MEDICINE

## 2024-03-31 PROCEDURE — 25810000003 LACTATED RINGERS PER 1000 ML: Performed by: SURGERY

## 2024-03-31 PROCEDURE — 25010000002 ENOXAPARIN PER 10 MG: Performed by: SURGERY

## 2024-03-31 RX ORDER — PANTOPRAZOLE SODIUM 40 MG/1
40 TABLET, DELAYED RELEASE ORAL
Status: DISCONTINUED | OUTPATIENT
Start: 2024-03-31 | End: 2024-04-01 | Stop reason: HOSPADM

## 2024-03-31 RX ORDER — SUCRALFATE 1 G/1
1 TABLET ORAL
Status: DISCONTINUED | OUTPATIENT
Start: 2024-03-31 | End: 2024-04-01 | Stop reason: HOSPADM

## 2024-03-31 RX ADMIN — HYDROMORPHONE HYDROCHLORIDE 2 MG: 4 TABLET ORAL at 14:25

## 2024-03-31 RX ADMIN — HYDROMORPHONE HYDROCHLORIDE 2 MG: 4 TABLET ORAL at 05:24

## 2024-03-31 RX ADMIN — HYDROMORPHONE HYDROCHLORIDE 2 MG: 4 TABLET ORAL at 09:47

## 2024-03-31 RX ADMIN — HYDROMORPHONE HYDROCHLORIDE 2 MG: 4 TABLET ORAL at 01:21

## 2024-03-31 RX ADMIN — ACETAMINOPHEN 325MG 650 MG: 325 TABLET ORAL at 01:22

## 2024-03-31 RX ADMIN — METRONIDAZOLE 500 MG: 500 INJECTION, SOLUTION INTRAVENOUS at 21:12

## 2024-03-31 RX ADMIN — METOCLOPRAMIDE 10 MG: 5 INJECTION, SOLUTION INTRAMUSCULAR; INTRAVENOUS at 17:07

## 2024-03-31 RX ADMIN — PANTOPRAZOLE SODIUM 40 MG: 40 INJECTION, POWDER, FOR SOLUTION INTRAVENOUS at 08:03

## 2024-03-31 RX ADMIN — SODIUM CHLORIDE, POTASSIUM CHLORIDE, SODIUM LACTATE AND CALCIUM CHLORIDE 75 ML/HR: 600; 310; 30; 20 INJECTION, SOLUTION INTRAVENOUS at 21:08

## 2024-03-31 RX ADMIN — METOCLOPRAMIDE 10 MG: 5 INJECTION, SOLUTION INTRAMUSCULAR; INTRAVENOUS at 09:47

## 2024-03-31 RX ADMIN — SUCRALFATE 1 G: 1 TABLET ORAL at 17:07

## 2024-03-31 RX ADMIN — METRONIDAZOLE 500 MG: 500 INJECTION, SOLUTION INTRAVENOUS at 14:25

## 2024-03-31 RX ADMIN — METRONIDAZOLE 500 MG: 500 INJECTION, SOLUTION INTRAVENOUS at 05:24

## 2024-03-31 RX ADMIN — HYDROMORPHONE HYDROCHLORIDE 2 MG: 4 TABLET ORAL at 23:25

## 2024-03-31 RX ADMIN — SUCRALFATE 1 G: 1 TABLET ORAL at 20:33

## 2024-03-31 RX ADMIN — ENOXAPARIN SODIUM 40 MG: 100 INJECTION SUBCUTANEOUS at 09:47

## 2024-03-31 RX ADMIN — FLUCONAZOLE IN SODIUM CHLORIDE 400 MG: 2 INJECTION, SOLUTION INTRAVENOUS at 08:02

## 2024-03-31 RX ADMIN — SODIUM CHLORIDE, POTASSIUM CHLORIDE, SODIUM LACTATE AND CALCIUM CHLORIDE 100 ML/HR: 600; 310; 30; 20 INJECTION, SOLUTION INTRAVENOUS at 08:04

## 2024-03-31 RX ADMIN — HYDROMORPHONE HYDROCHLORIDE 2 MG: 4 TABLET ORAL at 19:18

## 2024-03-31 RX ADMIN — SUCRALFATE 1 G: 1 TABLET ORAL at 12:31

## 2024-03-31 RX ADMIN — METOCLOPRAMIDE 10 MG: 5 INJECTION, SOLUTION INTRAMUSCULAR; INTRAVENOUS at 22:26

## 2024-03-31 RX ADMIN — CEFTRIAXONE 2000 MG: 2 INJECTION, POWDER, FOR SOLUTION INTRAMUSCULAR; INTRAVENOUS at 14:25

## 2024-03-31 RX ADMIN — METOCLOPRAMIDE 10 MG: 5 INJECTION, SOLUTION INTRAMUSCULAR; INTRAVENOUS at 05:24

## 2024-03-31 NOTE — PROGRESS NOTES
LOS: 1 day   Patient Care Team:  Provider, No Known as PCP - General    Chief Complaint:  none    Interval History:   Patient Denies:  n/v/cp/soa  Patient doing better. + flatus      Vital Signs  Temp:  [97.7 °F (36.5 °C)-98.8 °F (37.1 °C)] 97.7 °F (36.5 °C)  Heart Rate:  [76-89] 78  Resp:  [16] 16  BP: ()/(55-71) 99/61      Physical Exam:   Heart: RR   Abd:  soft and nd; incisions c/d/i   Ext:  No clubbing, cyanosis     Results Review:     I reviewed the patient's new clinical results.    Lab Results (last 24 hours)       Procedure Component Value Units Date/Time    Body Fluid Culture - Body Fluid, Abdominal Wall [[id number]]  (Abnormal) Collected: 03/29/24 0030    Specimen: Body Fluid from Abdominal Wall Updated: 03/31/24 0800     Body Fluid Culture Rare Staphylococcus, coagulase negative      Rare Streptococcus, Alpha Hemolytic     Gram Stain Moderate (3+) WBCs per low power field      Few (2+) Gram positive cocci in pairs and clusters    Narrative:      3/30: all scant/rare normal duodenal pattie    Comprehensive Metabolic Panel [[id number]]  (Abnormal) Collected: 03/31/24 0501    Specimen: Blood Updated: 03/31/24 0611     Glucose 86 mg/dL      BUN 6 mg/dL      Creatinine 0.58 mg/dL      Sodium 138 mmol/L      Potassium 3.4 mmol/L      Chloride 104 mmol/L      CO2 24.0 mmol/L      Calcium 7.9 mg/dL      Total Protein 5.0 g/dL      Albumin 3.0 g/dL      ALT (SGPT) 35 U/L      AST (SGOT) 23 U/L      Alkaline Phosphatase 83 U/L      Total Bilirubin 0.6 mg/dL      Globulin 2.0 gm/dL      A/G Ratio 1.5 g/dL      BUN/Creatinine Ratio 10.3     Anion Gap 10.0 mmol/L      eGFR 119.0 mL/min/1.73     Narrative:      GFR Normal >60  Chronic Kidney Disease <60  Kidney Failure <15      CBC (No Diff) [[id number]]  (Abnormal) Collected: 03/31/24 0501    Specimen: Blood Updated: 03/31/24 0555     WBC 5.48 10*3/mm3      RBC 3.38 10*6/mm3      Hemoglobin 9.9 g/dL      Hematocrit 29.2 %      MCV 86.4 fL      MCH 29.3 pg       MCHC 33.9 g/dL      RDW 13.0 %      RDW-SD 40.8 fl      MPV 12.0 fL      Platelets 113 10*3/mm3     Blood Culture - Blood, Arm, Right [[id number]]  (Normal) Collected: 03/28/24 2042    Specimen: Blood from Arm, Right Updated: 03/30/24 2100     Blood Culture No growth at 2 days    Blood Culture - Blood, Hand, Right [[id number]]  (Normal) Collected: 03/28/24 2042    Specimen: Blood from Hand, Right Updated: 03/30/24 2100     Blood Culture No growth at 2 days    C-reactive Protein [[id number]]  (Abnormal) Collected: 03/30/24 0832    Specimen: Blood Updated: 03/30/24 1253     C-Reactive Protein 24.13 mg/dL             Assessment/Plan:    Perforated bowel    History of gastric bypass    Perforation of viscus    Leukocytosis      38 y.o. female  pod#2  Doing well and remains afebrile and hd stable; abd soft and flatus. Appreciate ID help. Advance full liquid diet. Hopefully am tomorrow if okay with ID.      Tate Whitley Jr., MD  Medical Director [facility]  [facility]    03/31/24

## 2024-03-31 NOTE — PLAN OF CARE
AAOX4. RA. SR. Ambulated the shift several times today. PRN Dilaudid given for pain. Effective. Diet upgraded, tolerating well. No BM this shift but passing flatulence. MARVEL drain C/D/I. Serous fluid present in bulb. IV fluids decreased.       Goal Outcome Evaluation:  Plan of Care Reviewed With: patient, spouse        Progress: improving       Problem: Adult Inpatient Plan of Care  Goal: Plan of Care Review  Outcome: Ongoing, Progressing  Flowsheets (Taken 3/31/2024 1832)  Progress: improving  Plan of Care Reviewed With:   patient   spouse  Goal: Patient-Specific Goal (Individualized)  Outcome: Ongoing, Progressing  Goal: Absence of Hospital-Acquired Illness or Injury  Outcome: Ongoing, Progressing  Intervention: Identify and Manage Fall Risk  Recent Flowsheet Documentation  Taken 3/31/2024 1805 by Catia Mckinney, RN  Safety Promotion/Fall Prevention:   safety round/check completed   room organization consistent   nonskid shoes/slippers when out of bed   lighting adjusted   clutter free environment maintained   activity supervised   assistive device/personal items within reach  Taken 3/31/2024 1600 by Catia Mckinney, RN  Safety Promotion/Fall Prevention:   safety round/check completed   room organization consistent   nonskid shoes/slippers when out of bed   clutter free environment maintained   assistive device/personal items within reach   activity supervised  Taken 3/31/2024 1425 by Catia Mckinney, RN  Safety Promotion/Fall Prevention:   safety round/check completed   room organization consistent   nonskid shoes/slippers when out of bed   lighting adjusted   clutter free environment maintained   assistive device/personal items within reach   activity supervised  Taken 3/31/2024 1230 by Catia Mckinney, RN  Safety Promotion/Fall Prevention:   safety round/check completed   room organization consistent   nonskid shoes/slippers when out of bed   lighting adjusted   clutter free environment maintained    assistive device/personal items within reach   activity supervised  Taken 3/31/2024 1017 by Catia Mckinney RN  Safety Promotion/Fall Prevention:   safety round/check completed   room organization consistent   nonskid shoes/slippers when out of bed   lighting adjusted   clutter free environment maintained   assistive device/personal items within reach   activity supervised  Taken 3/31/2024 0800 by Catia Mckinney RN  Safety Promotion/Fall Prevention:   safety round/check completed   room organization consistent   nonskid shoes/slippers when out of bed   lighting adjusted   clutter free environment maintained   assistive device/personal items within reach   activity supervised  Intervention: Prevent Skin Injury  Recent Flowsheet Documentation  Taken 3/31/2024 1805 by Catia Mckinney RN  Body Position: position changed independently  Taken 3/31/2024 1600 by Catia Mckinney RN  Body Position: position changed independently  Taken 3/31/2024 1425 by Catia Mckinney RN  Body Position: position changed independently  Skin Protection:   adhesive use limited   tubing/devices free from skin contact   transparent dressing maintained   skin-to-skin areas padded   protective footwear used  Taken 3/31/2024 1230 by Catia Mckinney RN  Body Position: position changed independently  Taken 3/31/2024 1017 by Catia Mckinney RN  Body Position: position changed independently  Taken 3/31/2024 0800 by Catia Mckinney RN  Body Position: position changed independently  Skin Protection:   tubing/devices free from skin contact   transparent dressing maintained   protective footwear used   adhesive use limited  Intervention: Prevent and Manage VTE (Venous Thromboembolism) Risk  Recent Flowsheet Documentation  Taken 3/31/2024 1805 by Catia Mckinney RN  Activity Management: up ad marlon  Taken 3/31/2024 1800 by Catia Mckinney RN  Activity Management: ambulated outside room  Taken 3/31/2024 1600 by Catia Mckinney  RN  Activity Management: up ad marlon  Taken 3/31/2024 1425 by Catia Mckinney RN  Activity Management: activity encouraged  VTE Prevention/Management:   bilateral   sequential compression devices on  Range of Motion: active ROM (range of motion) encouraged  Taken 3/31/2024 1200 by Catia Mckinney RN  Activity Management:   ambulated in room   ambulated to bathroom  Taken 3/31/2024 0900 by Catia Mckinney RN  Activity Management: ambulated outside room  Taken 3/31/2024 0800 by Catia Mckinney RN  Activity Management: activity encouraged  VTE Prevention/Management:   bilateral   sequential compression devices on  Range of Motion: active ROM (range of motion) encouraged  Intervention: Prevent Infection  Recent Flowsheet Documentation  Taken 3/31/2024 1805 by Catia Mckinney RN  Infection Prevention:   rest/sleep promoted   single patient room provided   personal protective equipment utilized   hand hygiene promoted  Taken 3/31/2024 1600 by Catia Mckinney RN  Infection Prevention:   single patient room provided   rest/sleep promoted   personal protective equipment utilized   hand hygiene promoted  Taken 3/31/2024 1425 by Catia Mckinney RN  Infection Prevention:   single patient room provided   rest/sleep promoted   hand hygiene promoted   personal protective equipment utilized  Taken 3/31/2024 1230 by Catia Mckinney RN  Infection Prevention:   single patient room provided   rest/sleep promoted   personal protective equipment utilized   hand hygiene promoted  Taken 3/31/2024 1017 by Catia Mckinney RN  Infection Prevention:   single patient room provided   rest/sleep promoted   personal protective equipment utilized   hand hygiene promoted  Taken 3/31/2024 0800 by Catia Mckinney RN  Infection Prevention:   single patient room provided   rest/sleep promoted   personal protective equipment utilized   hand hygiene promoted  Goal: Optimal Comfort and Wellbeing  Outcome: Ongoing,  Progressing  Intervention: Monitor Pain and Promote Comfort  Recent Flowsheet Documentation  Taken 3/31/2024 1425 by Catia Mckinney RN  Pain Management Interventions: see MAR  Taken 3/31/2024 0947 by Catia Mckinney RN  Pain Management Interventions:   see MAR   position adjusted   pillow support provided   care clustered  Taken 3/31/2024 0800 by Catia Mckinney RN  Pain Management Interventions:   see MAR   pillow support provided   position adjusted  Intervention: Provide Person-Centered Care  Recent Flowsheet Documentation  Taken 3/31/2024 1425 by Catia Mckinney RN  Trust Relationship/Rapport:   care explained   emotional support provided   choices provided   empathic listening provided   questions answered   questions encouraged   reassurance provided   thoughts/feelings acknowledged  Taken 3/31/2024 0800 by Catia Mckinney RN  Trust Relationship/Rapport:   care explained   choices provided   emotional support provided   empathic listening provided   questions answered   questions encouraged   reassurance provided   thoughts/feelings acknowledged  Goal: Readiness for Transition of Care  Outcome: Ongoing, Progressing

## 2024-03-31 NOTE — PLAN OF CARE
Goal Outcome Evaluation:  Plan of Care Reviewed With: patient        Progress: improving  Outcome Evaluation: Pt A&Ox4. VSS. Pt c/o abdominal pain and headache with PRN dilaudid and tylenol given. Pt ambulating to BR. Pt denies nausea. MARVEL drain with serous output. Dressing to MARVEL site changed.

## 2024-03-31 NOTE — PROGRESS NOTES
ID NOTE    CC: f/u perforated viscus    Subj: No fever. Took in some broth this AM. She reports a HA.    Medications:    Current Facility-Administered Medications:     acetaminophen (TYLENOL) tablet 650 mg, 650 mg, Oral, Q4H PRN, Tate Whitley Jr., MD, 650 mg at 03/31/24 0122    albuterol (PROVENTIL) nebulizer solution 0.083% 2.5 mg/3mL, 2.5 mg, Nebulization, Q4H PRN, Tate Whitley Jr., MD    amisulpride (antiemetic) (BARHEMSYS) injection 10 mg, 10 mg, Intravenous, Once PRN, Tate Whitley Jr., MD    cefTRIAXone (ROCEPHIN) 2,000 mg in sodium chloride 0.9 % 100 mL MBP, 2,000 mg, Intravenous, Q24H, Kimo Child MD, Last Rate: 200 mL/hr at 03/30/24 1421, 2,000 mg at 03/30/24 1421    diphenhydrAMINE (BENADRYL) injection 25 mg, 25 mg, Intravenous, Q4H PRN, Tate Whitley Jr., MD    Enoxaparin Sodium (LOVENOX) syringe 40 mg, 40 mg, Subcutaneous, Q24H, Tate Whitley Jr., MD, 40 mg at 03/30/24 1037    fluconazole (DIFLUCAN) IVPB 400 mg, 400 mg, Intravenous, Q24H, Kimo Child MD, Last Rate: 100 mL/hr at 03/31/24 0802, 400 mg at 03/31/24 0802    hydrALAZINE (APRESOLINE) injection 10 mg, 10 mg, Intravenous, Q30 Min PRN, Tate Whitley Jr., MD    HYDROmorphone (DILAUDID) tablet 2 mg, 2 mg, Oral, Q4H PRN, Tate Whitley Jr., MD, 2 mg at 03/31/24 0524    lactated ringers infusion, 100 mL/hr, Intravenous, Continuous, Tate Whitley Jr., MD, Last Rate: 100 mL/hr at 03/31/24 0804, 100 mL/hr at 03/31/24 0804    metoclopramide (REGLAN) injection 10 mg, 10 mg, Intravenous, Q6H, Tate Whitley Jr., MD, 10 mg at 03/31/24 0524    metroNIDAZOLE (FLAGYL) IVPB 500 mg, 500 mg, Intravenous, Q8H, Kimo Child MD, Last Rate: 200 mL/hr at 03/31/24 0524, 500 mg at 03/31/24 0524    [DISCONTINUED] HYDROmorphone (DILAUDID) injection 0.5 mg, 0.5 mg, Intravenous, Q2H PRN, 0.5 mg at 03/30/24 1037 **AND** naloxone (NARCAN) injection 0.1 mg, 0.1 mg, Intravenous,  Q5 Min PRN, Tate Whitley Jr., MD    ondansetron ODT (ZOFRAN-ODT) disintegrating tablet 4 mg, 4 mg, Oral, Q4H PRN **OR** ondansetron (ZOFRAN) injection 4 mg, 4 mg, Intravenous, Q4H PRN, Tate Whitley Jr., MD    pantoprazole (PROTONIX) injection 40 mg, 40 mg, Intravenous, Q12H, Tate Whitley Jr., MD, 40 mg at 03/31/24 0803    phenol (CHLORASEPTIC) 1.4 % liquid 1 spray, 1 spray, Mouth/Throat, Q2H PRN, Tate Whitley Jr., MD    prochlorperazine (COMPAZINE) injection 10 mg, 10 mg, Intravenous, Q6H PRN, Tate Whitley Jr., MD    promethazine (PHENERGAN) tablet 12.5 mg, 12.5 mg, Oral, Q4H PRN **OR** promethazine (PHENERGAN) suppository 12.5 mg, 12.5 mg, Rectal, Q4H PRN, Tate Whitley Jr., MD      Objective   Vital Signs   Temp:  [97.7 °F (36.5 °C)-98.8 °F (37.1 °C)] 97.7 °F (36.5 °C)  Heart Rate:  [76-89] 78  Resp:  [16] 16  BP: ()/(55-71) 99/61    Physical Exam:   General: awake, clearly does not feel well, sitting up in bed  Eyes: no scleral icterus  Cardiovascular: NR  Respiratory: normal work of breathing on RA  GI: Abdomen bandaged with MARVEL drain present  :  no Rodgers catheter  Skin: No rashes  Neurological: Alert and oriented x 3  Psychiatric: Normal mood and affect   Vasc: PIV w/o erythema    Labs:   CBC, CMP, CRP, and body fluid culture reviewed today  Lab Results   Component Value Date    WBC 5.48 03/31/2024    HGB 9.9 (L) 03/31/2024    HCT 29.2 (L) 03/31/2024    MCV 86.4 03/31/2024     (L) 03/31/2024       Lab Results   Component Value Date    GLUCOSE 86 03/31/2024    BUN 6 03/31/2024    CREATININE 0.58 03/31/2024    BCR 10.3 03/31/2024    CO2 24.0 03/31/2024    CALCIUM 7.9 (L) 03/31/2024    ALBUMIN 3.0 (L) 03/31/2024    AST 23 03/31/2024    ALT 35 (H) 03/31/2024     Lab Results   Component Value Date    CRP 24.13 (H) 03/30/2024     Microbiology:  3/28 BCx: Negative to date  3/29 abdominal wall culture: Rare amounts of coagulase-negative staph and alphahemolytic  Streptococcus    Prior Radiology:  CT abdomen/pelvis with findings consistent with perforated viscus    ASSESSMENT/PLAN:  Perforated viscus  Leukocytosis - resolved  History of Donis-en-Y gastric bypass surgery  Amoxicillin allergy    Thanks to bariatric surgeon for his prompt intervention to help provide source control.  I recommend that we continue ceftriaxone 2 g IV every 24 hours, Flagyl 500 mg IV every 8 hours, and fluconazole 400 mg IV every 24 hours with the duration to be determined.   It is good to see that her WBC has normalized.  She is hoping she can eat more soon and work towards getting out of the hospital in the coming days.  I think it should be able to transition her to an oral antibiotic regiment and discharge if she is improved and we are beginning to trust her absorption.    Check CBC and BMP in the a.m. for close monitoring while on broad-spectrum antibiotics.    ID will follow.

## 2024-04-01 VITALS
SYSTOLIC BLOOD PRESSURE: 122 MMHG | DIASTOLIC BLOOD PRESSURE: 72 MMHG | OXYGEN SATURATION: 99 % | HEART RATE: 66 BPM | BODY MASS INDEX: 23.48 KG/M2 | WEIGHT: 132.49 LBS | TEMPERATURE: 97.9 F | HEIGHT: 63 IN | RESPIRATION RATE: 18 BRPM

## 2024-04-01 LAB
ANION GAP SERPL CALCULATED.3IONS-SCNC: 11 MMOL/L (ref 5–15)
BUN SERPL-MCNC: 5 MG/DL (ref 6–20)
BUN/CREAT SERPL: 10.9 (ref 7–25)
CALCIUM SPEC-SCNC: 8.5 MG/DL (ref 8.6–10.5)
CHLORIDE SERPL-SCNC: 101 MMOL/L (ref 98–107)
CO2 SERPL-SCNC: 25 MMOL/L (ref 22–29)
CREAT SERPL-MCNC: 0.46 MG/DL (ref 0.57–1)
DEPRECATED RDW RBC AUTO: 37.6 FL (ref 37–54)
EGFRCR SERPLBLD CKD-EPI 2021: 125.8 ML/MIN/1.73
ERYTHROCYTE [DISTWIDTH] IN BLOOD BY AUTOMATED COUNT: 12.8 % (ref 12.3–15.4)
GLUCOSE SERPL-MCNC: 86 MG/DL (ref 65–99)
HCT VFR BLD AUTO: 29 % (ref 34–46.6)
HGB BLD-MCNC: 9.8 G/DL (ref 12–15.9)
MCH RBC QN AUTO: 28.5 PG (ref 26.6–33)
MCHC RBC AUTO-ENTMCNC: 33.8 G/DL (ref 31.5–35.7)
MCV RBC AUTO: 84.3 FL (ref 79–97)
PLATELET # BLD AUTO: 134 10*3/MM3 (ref 140–450)
PMV BLD AUTO: 11.5 FL (ref 6–12)
POTASSIUM SERPL-SCNC: 3.4 MMOL/L (ref 3.5–5.2)
RBC # BLD AUTO: 3.44 10*6/MM3 (ref 3.77–5.28)
SODIUM SERPL-SCNC: 137 MMOL/L (ref 136–145)
WBC NRBC COR # BLD AUTO: 4.76 10*3/MM3 (ref 3.4–10.8)

## 2024-04-01 PROCEDURE — 25010000002 METOCLOPRAMIDE PER 10 MG: Performed by: SURGERY

## 2024-04-01 PROCEDURE — 99232 SBSQ HOSP IP/OBS MODERATE 35: CPT | Performed by: INTERNAL MEDICINE

## 2024-04-01 PROCEDURE — 85027 COMPLETE CBC AUTOMATED: CPT | Performed by: SURGERY

## 2024-04-01 PROCEDURE — 80048 BASIC METABOLIC PNL TOTAL CA: CPT | Performed by: SURGERY

## 2024-04-01 PROCEDURE — 25010000002 METRONIDAZOLE 500 MG/100ML SOLUTION: Performed by: INTERNAL MEDICINE

## 2024-04-01 PROCEDURE — 25010000002 FLUCONAZOLE PER 200 MG: Performed by: INTERNAL MEDICINE

## 2024-04-01 PROCEDURE — 25010000002 ENOXAPARIN PER 10 MG: Performed by: SURGERY

## 2024-04-01 RX ORDER — FLUCONAZOLE 40 MG/ML
400 POWDER, FOR SUSPENSION ORAL DAILY
Status: DISCONTINUED | OUTPATIENT
Start: 2024-04-02 | End: 2024-04-01 | Stop reason: HOSPADM

## 2024-04-01 RX ORDER — POTASSIUM CHLORIDE 1.5 G/1.58G
40 POWDER, FOR SOLUTION ORAL ONCE
Status: DISCONTINUED | OUTPATIENT
Start: 2024-04-01 | End: 2024-04-01 | Stop reason: HOSPADM

## 2024-04-01 RX ORDER — SUCRALFATE 1 G/1
1 TABLET ORAL
Qty: 120 TABLET | Refills: 1 | Status: SHIPPED | OUTPATIENT
Start: 2024-04-01

## 2024-04-01 RX ORDER — METRONIDAZOLE 500 MG/1
500 TABLET ORAL EVERY 8 HOURS SCHEDULED
Status: DISCONTINUED | OUTPATIENT
Start: 2024-04-02 | End: 2024-04-01 | Stop reason: HOSPADM

## 2024-04-01 RX ORDER — CEFDINIR 250 MG/5ML
300 POWDER, FOR SUSPENSION ORAL EVERY 12 HOURS SCHEDULED
Qty: 200 ML | Refills: 0 | Status: SHIPPED | OUTPATIENT
Start: 2024-04-02 | End: 2024-04-18

## 2024-04-01 RX ORDER — FLUCONAZOLE 40 MG/ML
400 POWDER, FOR SUSPENSION ORAL DAILY
Qty: 140 ML | Refills: 0 | Status: SHIPPED | OUTPATIENT
Start: 2024-04-02 | End: 2024-04-13

## 2024-04-01 RX ORDER — CEFDINIR 250 MG/5ML
300 POWDER, FOR SUSPENSION ORAL EVERY 12 HOURS SCHEDULED
Status: DISCONTINUED | OUTPATIENT
Start: 2024-04-02 | End: 2024-04-01 | Stop reason: HOSPADM

## 2024-04-01 RX ORDER — METRONIDAZOLE 500 MG/1
500 TABLET ORAL EVERY 8 HOURS SCHEDULED
Qty: 33 TABLET | Refills: 0 | Status: SHIPPED | OUTPATIENT
Start: 2024-04-02 | End: 2024-04-13

## 2024-04-01 RX ORDER — TRAMADOL HYDROCHLORIDE 50 MG/1
50 TABLET ORAL EVERY 6 HOURS PRN
Qty: 12 TABLET | Refills: 0 | Status: SHIPPED | OUTPATIENT
Start: 2024-04-01

## 2024-04-01 RX ADMIN — METRONIDAZOLE 500 MG: 500 INJECTION, SOLUTION INTRAVENOUS at 06:19

## 2024-04-01 RX ADMIN — ACETAMINOPHEN 325MG 650 MG: 325 TABLET ORAL at 02:22

## 2024-04-01 RX ADMIN — HYDROMORPHONE HYDROCHLORIDE 2 MG: 4 TABLET ORAL at 11:33

## 2024-04-01 RX ADMIN — FLUCONAZOLE IN SODIUM CHLORIDE 400 MG: 2 INJECTION, SOLUTION INTRAVENOUS at 07:41

## 2024-04-01 RX ADMIN — METOCLOPRAMIDE 10 MG: 5 INJECTION, SOLUTION INTRAMUSCULAR; INTRAVENOUS at 11:35

## 2024-04-01 RX ADMIN — PANTOPRAZOLE SODIUM 40 MG: 40 TABLET, DELAYED RELEASE ORAL at 06:19

## 2024-04-01 RX ADMIN — ENOXAPARIN SODIUM 40 MG: 100 INJECTION SUBCUTANEOUS at 11:33

## 2024-04-01 RX ADMIN — HYDROMORPHONE HYDROCHLORIDE 2 MG: 4 TABLET ORAL at 15:18

## 2024-04-01 RX ADMIN — HYDROMORPHONE HYDROCHLORIDE 2 MG: 4 TABLET ORAL at 07:31

## 2024-04-01 RX ADMIN — METOCLOPRAMIDE 10 MG: 5 INJECTION, SOLUTION INTRAMUSCULAR; INTRAVENOUS at 03:33

## 2024-04-01 RX ADMIN — SUCRALFATE 1 G: 1 TABLET ORAL at 07:41

## 2024-04-01 RX ADMIN — ACETAMINOPHEN 325MG 650 MG: 325 TABLET ORAL at 07:31

## 2024-04-01 RX ADMIN — SUCRALFATE 1 G: 1 TABLET ORAL at 11:33

## 2024-04-01 RX ADMIN — HYDROMORPHONE HYDROCHLORIDE 2 MG: 4 TABLET ORAL at 03:33

## 2024-04-01 NOTE — PAYOR COMM NOTE
"Rosa Lutz (38 y.o. Female)     PLEASE SEE ATTACHED FOR INPT AUTH     PT WAS OBS ON 3/28  CHANGED TO INPT ON 3/30    REF #   [id number]    PLEASE CALL KAYLYN BETHEA RN/ DEPT @ [phone]  OR FAX  DEPARTMENT @  [phone]    THANK YOU   KAYLYN BETHEA RN  [facility]           Date of Birth   1985    Social Security Number       Address   [address] [place] [address] [place] [address]    Home Phone   [phone]    MRN   [id number]       P[employer]   Fa[employer]    Marital Status   Single                            Admission Date   3/28/24 OBS  3/30  INPT     Admission Type   Emergency    Admitting Provider   Tate Whitley Jr., MD    Attending Provider   Tate Whitley Jr., MD    Department, Room/Bed   [address], S603/1       Discharge Date       Discharge Disposition   Home or Self Care    Discharge Destination                                 Attending Provider: Tate Whitley Jr., MD    Allergies: Penicillins, Sulfa Antibiotics    Isolation: None   Infection: None   Code Status: CPR    Ht: 160 cm (63\")   Wt: 60.1 kg (132 lb 7.9 oz)    Admission Cmt: None   Principal Problem: Perforated bowel [K63.1]                   Active Insurance as of 3/28/2024       Primary Coverage       Payor Plan Insurance Group Employer/Plan Group    [facility] PPO [id number]       Payor Plan Address Payor Plan Phone Number Payor Plan Fax Number Effective Dates    [address] [phone]  1/1/2021 - None Entered    [place] [address]         Subscriber Name Subscriber Birth Date Member ID       ROSA LUTZ 1985 [id number]                     Emergency Contacts        (Rel.) Home Phone Work Phone Mobile Phone    MONAE ROBLES (Significant Other) -- -- [phone]              Brooks: NPI [id number]  Tax ID [id number]     History & Physical        Tate Whitley Jr., MD at 03/28/24 2322            " "  Patient Care Team:  Provider, No Known as PCP - General    Chief complaint abdominal pain    Subjective    Patient is a 38 y.o. female status post laparoscopic Donis-en-Y gastric bypass proximately 3 years ago in [facility] who is in [place] today on vacation started having abdominal pain this afternoon.  The pain in the left upper quadrant and then radiating to the epigastric region that got worse.  Patient presented to [facility] emergency room and underwent a CT scan the abdomen revealing free air and inflammation around the gastrojejunostomy.  Patient with elevated white count of 19,000.  Patient is afebrile and not tachycardic.  Patient describes the pain as a sharp stabbing type pain.  Patient denies fever chills shortness of air bowel or urinary problems..       Review of Systems   Pertinent items are noted in HPI, all other systems reviewed and negative    No past medical history on file.  No past surgical history on file.  No family history on file.     No medications prior to admission.     Allergies:  Penicillins and Sulfa antibiotics    Vital Signs  Temp:  [97.6 °F (36.4 °C)] 97.6 °F (36.4 °C)  Heart Rate:  [60-93] 93  Resp:  [20] 20  BP: (109-133)/(58-82) 133/71    Flowsheet Rows      Flowsheet Row First Filed Value   Admission Height 160 cm (63\") Documented at 03/28/2024 1741   Admission Weight 63.5 kg (140 lb) Documented at 03/28/2024 1741             Physical Exam:   GENERAL:alert, well appearing, and in no distress  HEENT: normochephalic, atraumatic, no scleral icterus  NECK: Supple;  no thyromegaly or lymphadenopathy  CARDIAC: regular rate     ABDOMEN: Soft and nondistended, tenderness greater in the upper abdomen; no rebound  EXTREMITIES: no cyanosis, clubbing   NEURO: alert and oriented x 3, normal speech,    SKIN: Moist, warm, no rashes.    Results Review:    I reviewed the patient's new clinical results.  I reviewed the patient's new imaging results and agree with the " interpretation.        * No active hospital problems. *      38-year-old female status post Donis-en-Y gastric bypass approximately 3 years ago with perforated bowel most likely perforated gastrojejunostomy ulcer.  Patient states that she had rotator cuff surgery back in December and took small amount of NSAIDs.  Patient also does vape.  We will proceed to the operating room for diagnostic laparoscopy possible open.  Patient did receive antibiotics in the emergency room and currently receiving them now.  Patient is getting IV fluids as well.  Patient not on any blood thinner medications.  The risks and benefits of the procedure were discussed with the patient in detail and all questions were answered.  Possibility of open, bleeding, infection, bowel injury, deep venous thrombosis, pulmonary embolism, incisional hernias, renal failure, myocardial infarction, respiratory and cardiac arrest and death were discussed. Consent will be signed and witnessed.    I discussed the patients findings and my recommendations with patient and nursing staff.     Tate Whitley MD  03/28/24  23:22 EDT    Time: Approximately 30 minutes was spent with the patient and over half that time was spent counseling.  All of the patients questions were answered.      Electronically signed by Tate Whitley Jr., MD at 03/28/24 2328          Emergency Department Notes        Galdino Groves RN at 03/28/24 2245          Labeled consent given to OR tech-pt sent to PACU in gown ONLY and socks. Pt states the only jewelry she has is a nose ring that they were unable to remove for previous surgery. Pipo Wolfe RN aware of transfer to OR.     Electronically signed by Galdino Groves RN at 03/28/24 2480       Galdino Groves RN at 03/28/24 2110          Nursing report ED to floor  Radha Vargas  38 y.o.  female    HPI :  HPI (Adult)  Stated Reason for Visit: LLQ pain, radiates to shoulder  History Obtained From: EMS    Chief Complaint  Chief  Complaint   Patient presents with    Abdominal Pain       Admitting doctor:   No admitting provider for patient encounter.    Admitting diagnosis:   There were no encounter diagnoses.    Code status:   Current Code Status       Date Active Code Status Order ID Comments User Context       Not on file            Allergies:   Penicillins and Sulfa antibiotics    Isolation:   No active isolations    Intake and Output    Intake/Output Summary (Last 24 hours) at 3/28/2024 2110  Last data filed at 3/28/2024 2110  Gross per 24 hour   Intake 1000 ml   Output --   Net 1000 ml       Weight:       03/28/24  1741   Weight: 63.5 kg (140 lb)       Most recent vitals:   Vitals:    03/28/24 2046 03/28/24 2048 03/28/24 2053 03/28/24 2055   BP:       Pulse: 79 79 86 72   Resp:       Temp:       SpO2: 97% 98% 97% 97%   Weight:       Height:           Active LDAs/IV Access:   Lines, Drains & Airways       Active LDAs       Name Placement date Placement time Site Days    Peripheral IV 03/28/24 1757 Left Antecubital 03/28/24 1757  Antecubital  less than 1                    Labs (abnormal labs have a star):   Labs Reviewed   COMPREHENSIVE METABOLIC PANEL - Abnormal; Notable for the following components:       Result Value    Glucose 105 (*)     Chloride 111 (*)     CO2 21.0 (*)     All other components within normal limits    Narrative:     GFR Normal >60  Chronic Kidney Disease <60  Kidney Failure <15     CBC WITH AUTO DIFFERENTIAL - Abnormal; Notable for the following components:    WBC 19.13 (*)     RBC 5.29 (*)     MPV 12.2 (*)     Neutrophil % 90.1 (*)     Lymphocyte % 5.8 (*)     Monocyte % 3.1 (*)     Neutrophils, Absolute 17.24 (*)     Immature Grans, Absolute 0.08 (*)     All other components within normal limits   LIPASE - Normal   HCG, SERUM, QUALITATIVE - Normal   SINGLE HS TROPONIN T - Normal    Narrative:     High Sensitive Troponin T Reference Range:  <14.0 ng/L- Negative Female for AMI  <22.0 ng/L- Negative Male for  AMI  >=14 - Abnormal Female indicating possible myocardial injury.  >=22 - Abnormal Male indicating possible myocardial injury.   Clinicians would have to utilize clinical acumen, EKG, Troponin, and serial changes to determine if it is an Acute Myocardial Infarction or myocardial injury due to an underlying chronic condition.        LACTIC ACID, PLASMA - Normal   BLOOD CULTURE   BLOOD CULTURE   RAINBOW DRAW    Narrative:     The following orders were created for panel order [place] Draw.  Procedure                               Abnormality         Status                     ---------                               -----------         ------                     Green Top (Gel)[[id number]]                                  Final result               Lavender Top[[id number]]                                     Final result               Gold Top - SST[[id number]]                                   Final result               Nickerson Top[[id number]]                                         In process                 Light Blue Top[[id number]]                                   Final result                 Please view results for these tests on the individual orders.   URINALYSIS W/ MICROSCOPIC IF INDICATED (NO CULTURE)   GREEN TOP   LAVENDER TOP   GOLD TOP - SST   LIGHT BLUE TOP   CBC AND DIFFERENTIAL    Narrative:     The following orders were created for panel order CBC & Differential.  Procedure                               Abnormality         Status                     ---------                               -----------         ------                     CBC Auto Differential[[id number]]        Abnormal            Final result                 Please view results for these tests on the individual orders.   GRAY TOP       EKG:   ECG 12 Lead Other; neck pain   Preliminary Result   HEART RATE= 83  bpm   RR Interval= 723  ms   CO Interval= 179  ms   P Horizontal Axis= -36  deg   P Front Axis= 62  deg   QRSD Interval= 111  ms   QT  Interval= 387  ms   QTcB= 455  ms   QRS Axis= -8  deg   T Wave Axis= 47  deg   - BORDERLINE ECG -   Sinus rhythm   Probable left atrial enlargement   RSR' in V1 or V2, right VCD or RVH   Electronically Signed By:    Date and Time of Study: 2024-03-28 18:29:49          Meds given in ED:   Medications   sodium chloride 0.9 % flush 10 mL (has no administration in time range)   sodium chloride 0.9 % flush 10 mL (has no administration in time range)   meropenem (MERREM) 1,000 mg in sodium chloride 0.9 % 100 mL MBP (1,000 mg Intravenous New Bag 3/28/24 2051)   Vancomycin HCl 1,250 mg in sodium chloride 0.9 % 250 mL VTB (has no administration in time range)   sodium chloride 0.9 % bolus 1,000 mL (0 mL Intravenous Stopped 3/28/24 2110)   ondansetron (ZOFRAN) injection 4 mg (4 mg Intravenous Given 3/28/24 1817)   morphine injection 4 mg (4 mg Intravenous Given 3/28/24 1816)   iopamidol (ISOVUE-300) 61 % injection 100 mL (85 mL Intravenous Given by Other 3/28/24 1937)   HYDROmorphone (DILAUDID) injection 0.5 mg (0.5 mg Intravenous Given 3/28/24 2007)       Imaging results:  XR Chest 1 View    Result Date: 3/28/2024  No evidence for active disease in the chest.  This report was finalized on 3/28/2024 7:08 PM by Dr. Jimmy Mann M.D on Workstation: [id number]       Ambulatory status:   - up with assist     Social issues:   Social History     Socioeconomic History    Marital status: Single       Peripheral Neurovascular  Peripheral Neurovascular (Adult)  Peripheral Neurovascular WDL: WDL    Neuro Cognitive  Neuro Cognitive (Adult)  Cognitive/Neuro/Behavioral WDL: WDL    Learning  Learning Assessment (Adult)  Learning Readiness and Ability: no barriers identified  Education Provided  Person Taught: patient    Respiratory  Respiratory WDL  Respiratory WDL: WDL  Breath Sounds  Breath Sounds: All Fields  All Lung Fields Breath Sounds: Anterior:, Posterior:, equal bilaterally, clear    Abdominal Pain       Pain Assessments  Pain  (Adult)  (0-10) Pain Rating: Rest: 7    NIH Stroke Scale       Galdino Groves RN  03/28/24 21:10 EDT      Electronically signed by Galdino Groves RN at 03/28/24 2110       Samantha Cuellar PA at 03/28/24 2004       Attestation signed by Demetri Brunner MD at 03/29/24 1643        SHARED APC FACE TO FACE: I performed a substantive part of the MDM during the patient's E/M visit. I personally evaluated and examined the patient. I personally made or approved the documented management plan and acknowledge its risk of complications.   Demetri Brunner MD 3/29/2024 16:43 EDT                          EMERGENCY DEPARTMENT ENCOUNTER      PCP: Provider, No Known  Patient Care Team:  Provider, No Known as PCP - General   Independent Historians: Patient    HPI:  Chief Complaint: Abdominal pain  A complete HPI/ROS/PMH/PSH/SH/FH are unobtainable due to: None    Chronic or social conditions impacting patient care (social determinants of health): None    Context: Radha Vargas is a 38 y.o. female who presents to the ED c/o acute left-sided abdominal pain that began around 2:00 this afternoon.  Patient reports episode of vomiting last night but none today.  She reports bowel movement yesterday which was normal and nonbloody.  She denies diarrhea.  She denies dysuria, hematuria, shortness of breath, chest pain.  She also reports some left-sided neck pain that is slightly worse with movement.  Patient reports surgical history of gastric bypass approximately 3 years ago in [facility] by Dr. Hansen.  She reports hernia repair and cholecystectomy as well but denies any complications from her gastric bypass.    Review of prior external notes and/or external test results outside of this encounter: Encounter with primary care provider on 3/13/2024.  She has history of GERD.  Visit was for follow-up of ER encounter for palpitations.      PAST MEDICAL HISTORY  Active Ambulatory Problems     Diagnosis Date Noted    No Active  Ambulatory Problems     Resolved Ambulatory Problems     Diagnosis Date Noted    No Resolved Ambulatory Problems     No Additional Past Medical History       The patient qualifies to receive the vaccine, but they have not yet received it.    PAST SURGICAL HISTORY  No past surgical history on file.      FAMILY HISTORY  No family history on file.      SOCIAL HISTORY  Social History     Socioeconomic History    Marital status: Single         ALLERGIES  Penicillins and Sulfa antibiotics        REVIEW OF SYSTEMS  Review of Systems   Constitutional:  Negative for fever.   Gastrointestinal:  Positive for abdominal pain, nausea and vomiting (x1 last night). Negative for diarrhea.   Genitourinary:  Negative for dysuria.        All systems reviewed and negative except for those discussed in HPI.       PHYSICAL EXAM    I have reviewed the triage vital signs and nursing notes.    ED Triage Vitals [03/28/24 1741]   Temp Heart Rate Resp BP SpO2   97.6 °F (36.4 °C) 60 20 117/62 100 %      Temp src Heart Rate Source Patient Position BP Location FiO2 (%)   -- -- -- -- --       Physical Exam  GENERAL: alert, appears uncomfortable  SKIN: Warm, dry  HENT: Normocephalic, atraumatic  EYES: no scleral icterus  CV: regular rhythm, regular rate  RESPIRATORY: normal effort, lungs clear  ABDOMEN: soft, significant tenderness to left abdomen on palpation, nondistended  MUSCULOSKELETAL: no deformity  NEURO: alert, moves all extremities, follows commands          LAB RESULTS  Recent Results (from the past 24 hour(s))   Green Top (Gel)    Collection Time: 03/28/24  5:58 PM   Result Value Ref Range    Extra Tube Hold for add-ons.    Lavender Top    Collection Time: 03/28/24  5:58 PM   Result Value Ref Range    Extra Tube hold for add-on    Gold Top - SST    Collection Time: 03/28/24  5:58 PM   Result Value Ref Range    Extra Tube Hold for add-ons.    Gray Top    Collection Time: 03/28/24  5:58 PM   Result Value Ref Range    Extra Tube Hold for  add-ons.    Light Blue Top    Collection Time: 03/28/24  5:58 PM   Result Value Ref Range    Extra Tube Hold for add-ons.    Comprehensive Metabolic Panel    Collection Time: 03/28/24  5:58 PM    Specimen: Blood   Result Value Ref Range    Glucose 105 (H) 65 - 99 mg/dL    BUN 11 6 - 20 mg/dL    Creatinine 0.85 0.57 - 1.00 mg/dL    Sodium 145 136 - 145 mmol/L    Potassium 4.3 3.5 - 5.2 mmol/L    Chloride 111 (H) 98 - 107 mmol/L    CO2 21.0 (L) 22.0 - 29.0 mmol/L    Calcium 9.1 8.6 - 10.5 mg/dL    Total Protein 6.7 6.0 - 8.5 g/dL    Albumin 4.5 3.5 - 5.2 g/dL    ALT (SGPT) 15 1 - 33 U/L    AST (SGOT) 15 1 - 32 U/L    Alkaline Phosphatase 71 39 - 117 U/L    Total Bilirubin 0.6 0.0 - 1.2 mg/dL    Globulin 2.2 gm/dL    A/G Ratio 2.0 g/dL    BUN/Creatinine Ratio 12.9 7.0 - 25.0    Anion Gap 13.0 5.0 - 15.0 mmol/L    eGFR 90.1 >60.0 mL/min/1.73   Lipase    Collection Time: 03/28/24  5:58 PM    Specimen: Blood   Result Value Ref Range    Lipase 29 13 - 60 U/L   hCG, Serum, Qualitative    Collection Time: 03/28/24  5:58 PM    Specimen: Blood   Result Value Ref Range    HCG Qualitative Negative Negative   Single High Sensitivity Troponin T    Collection Time: 03/28/24  5:58 PM    Specimen: Blood   Result Value Ref Range    HS Troponin T <6 <14 ng/L   CBC Auto Differential    Collection Time: 03/28/24  5:58 PM    Specimen: Blood   Result Value Ref Range    WBC 19.13 (H) 3.40 - 10.80 10*3/mm3    RBC 5.29 (H) 3.77 - 5.28 10*6/mm3    Hemoglobin 15.7 12.0 - 15.9 g/dL    Hematocrit 45.4 34.0 - 46.6 %    MCV 85.8 79.0 - 97.0 fL    MCH 29.7 26.6 - 33.0 pg    MCHC 34.6 31.5 - 35.7 g/dL    RDW 13.4 12.3 - 15.4 %    RDW-SD 42.1 37.0 - 54.0 fl    MPV 12.2 (H) 6.0 - 12.0 fL    Platelets 209 140 - 450 10*3/mm3    Neutrophil % 90.1 (H) 42.7 - 76.0 %    Lymphocyte % 5.8 (L) 19.6 - 45.3 %    Monocyte % 3.1 (L) 5.0 - 12.0 %    Eosinophil % 0.3 0.3 - 6.2 %    Basophil % 0.3 0.0 - 1.5 %    Immature Grans % 0.4 0.0 - 0.5 %    Neutrophils,  Absolute 17.24 (H) 1.70 - 7.00 10*3/mm3    Lymphocytes, Absolute 1.10 0.70 - 3.10 10*3/mm3    Monocytes, Absolute 0.60 0.10 - 0.90 10*3/mm3    Eosinophils, Absolute 0.05 0.00 - 0.40 10*3/mm3    Basophils, Absolute 0.06 0.00 - 0.20 10*3/mm3    Immature Grans, Absolute 0.08 (H) 0.00 - 0.05 10*3/mm3    nRBC 0.0 0.0 - 0.2 /100 WBC   Lactic Acid, Plasma    Collection Time: 03/28/24  5:58 PM    Specimen: Blood   Result Value Ref Range    Lactate 1.2 0.5 - 2.0 mmol/L   ECG 12 Lead Other; neck pain    Collection Time: 03/28/24  6:29 PM   Result Value Ref Range    QT Interval 387 ms    QTC Interval 455 ms   Urinalysis With Microscopic If Indicated (No Culture) - Urine, Clean Catch    Collection Time: 03/28/24  9:06 PM    Specimen: Urine, Clean Catch   Result Value Ref Range    Color, UA Yellow Yellow, Straw    Appearance, UA Clear Clear    pH, UA <=5.0 5.0 - 8.0    Specific Gravity, UA >=1.030 1.005 - 1.030    Glucose, UA Negative Negative    Ketones, UA Trace (A) Negative    Bilirubin, UA Negative Negative    Blood, UA Negative Negative    Protein, UA Negative Negative    Leuk Esterase, UA Negative Negative    Nitrite, UA Negative Negative    Urobilinogen, UA 0.2 E.U./dL 0.2 - 1.0 E.U./dL   Type & Screen    Collection Time: 03/28/24 10:40 PM    Specimen: Blood   Result Value Ref Range    ABO Type B     RH type Positive     Antibody Screen Negative     T&S Expiration Date 3/31/2024 11:59:59 PM        Ordered the above labs and independently reviewed and interpreted the results.        RADIOLOGY  CT Abdomen Pelvis With Contrast    Result Date: 3/28/2024  CT ABDOMEN PELVIS W CONTRAST-  Radiation dose reduction techniques were utilized, including automated exposure control and exposure modulation based on body size.  Clinical: Left-sided abdominal pain  COMPARISON: None  FINDINGS: 1. Upper abdominal postoperative change having the appearance of gastric bypass with gastrojejunostomy. Thick walled loops of jejunum demonstrated  within the left abdomen consistent with enteritis. The most pronounced wall thickening appears to be just beyond the gastrojejunostomy site. There is a considerable amount of free intraperitoneal air within the upper abdomen. There are several gas bubbles seen within the left upper quadrant near the operative site. The findings are consistent with perforated viscus however the exact location cannot be with certainty. Possibly near the gastrojejunostomy. There is a small amount of free intraperitoneal fluid within the upper abdomen and pelvis. Attenuation compatible with serous fluid.  2. No biliary duct dilatation status post cholecystectomy. The liver, pancreas, spleen, adrenal glands and kidneys have a normal appearance. No calculus or obstructive uropathy. Diameter of the aorta is normal. The urinary bladder is satisfactory in appearance.  3. Uterus and ovaries have a normal appearance. Remainder is unremarkable.  FINDINGS called directly to Samantha Cuellar at the time of completion, 8:00 p.m.    This report was finalized on 3/28/2024 8:02 PM by Dr. Gabriel Connor M.D on Workstation: [id number]      XR Chest 1 View    Result Date: 3/28/2024  CHEST SINGLE VIEW  HISTORY: Left shoulder and neck pain.  COMPARISON: None  FINDINGS: Cardiomediastinal silhouette is within normal limits. Lungs appear clear and there is no evidence for pulmonary edema or pleural effusion. Cardiac monitoring leads are present. There is mild elevation of the left hemidiaphragm.      No evidence for active disease in the chest.  This report was finalized on 3/28/2024 7:08 PM by Dr. Jimmy Mann M.D on Workstation: [id number]       I ordered the above noted radiological studies. Independently reviewed and interpreted by me.  See dictation for official radiology interpretation.      PROCEDURES    Procedures      MEDICATIONS GIVEN IN ER    Medications   sodium chloride 0.9 % flush 10 mL ( Intravenous MAR Hold 3/28/24 4857)   sodium chloride 0.9  % flush 10 mL ( Intravenous MAR Hold 3/28/24 2304)   lactated ringers infusion (9 mL/hr Intravenous New Bag 3/28/24 2306)   sodium chloride 0.9 % bolus 1,000 mL (0 mL Intravenous Stopped 3/28/24 2110)   ondansetron (ZOFRAN) injection 4 mg (4 mg Intravenous Given 3/28/24 1817)   morphine injection 4 mg (4 mg Intravenous Given 3/28/24 1816)   iopamidol (ISOVUE-300) 61 % injection 100 mL (85 mL Intravenous Given by Other 3/28/24 1937)   HYDROmorphone (DILAUDID) injection 0.5 mg (0.5 mg Intravenous Given 3/28/24 2007)   meropenem (MERREM) 1,000 mg in sodium chloride 0.9 % 100 mL MBP (1,000 mg Intravenous New Bag 3/28/24 2051)   Vancomycin HCl 1,250 mg in sodium chloride 0.9 % 250 mL VTB ( Intravenous Currently Infusing 3/28/24 2311)   HYDROmorphone (DILAUDID) injection 1 mg (1 mg Intravenous Given 3/28/24 2242)   famotidine (PEPCID) injection 20 mg (20 mg Intravenous Given 3/28/24 2306)         PROGRESS, DATA ANALYSIS, CONSULTS, AND MEDICAL DECISION MAKING    All labs have been independently reviewed and interpreted by me.  All radiology studies have been independently reviewed and interpreted by me and discussed with radiologist dictating the report.   EKG's independently reviewed and interpreted by me.  Discussion below represents my analysis of pertinent findings related to patient's condition, differential diagnosis, treatment plan and final disposition.    My differential diagnosis for abdominal pain includes but is not limited to:    Gastritis, gastroenteritis, peptic ulcer disease, GERD, esophageal perforation, acute appendicitis, mesenteric adenitis, Meckel’s diverticulum, epiploic appendagitis, diverticulitis, colon cancer, ulcerative colitis, Crohn’s disease, intussusception, small bowel obstruction, adhesions, ischemic bowel, perforated viscus, ileus, obstipation, biliary colic, cholecystitis, cholelithiasis, Frantz-Jason Timbo, hepatitis, pancreatitis, common bile duct obstruction, cholangitis, bile leak,  splenic trauma, splenic rupture, splenic infarction, splenic abscess, abdominal abscess, ascites, spontaneous bacterial peritonitis, hernia, UTI, cystitis, ureterolithiasis, urinary obstruction, ovarian cyst, torsion, pregnancy, ectopic pregnancy, PID, pelvic abscess, mittelschmerz, endometriosis, AAA, myocardial infarction, pneumonia, cancer, porphyria, DKA, medications, sickle cell, viral syndrome, herpes zoster      ED Course as of 03/28/24 2357   Thu Mar 28, 2024   1833 EKG interpreted by me demonstrates sinus rhythm, rate 83, no [place]/QT prolongation, no ST elevation [MW]   1848 Lipase: 29 [DC]   1848 WBC(!): 19.13 [DC]   1848 Hemoglobin: 15.7 [DC]   1848 Platelets: 209 [DC]   1848 Creatinine: 0.85 [DC]   1848 Sodium: 145 [DC]   1848 Potassium: 4.3 [DC]   1848 CO2(!): 21.0 [DC]   1848 HS Troponin T: <6 [DC]   2002 Discussed CT with radiologist, Dr. Connor, who reports free air, possibly gastric in origin is noted. Pt has abnormal anatomy secondary to surgical history. No obvious diverticulitis. [DC]   2007 Amoxicillin caused him to swell.  Will give patient meropenem and vancomycin per pharmacy recommendation.  [DC]   2007 Discussed results with patient and plan for antibiotics and admission.  He reports gastric bypass was performed by Dr. Hansen in [place] about 3 years ago.  She did have a hernia repair performed after the surgery but no other reported complications. [DC]   2039 Discussed case with Dr. Colon, surgery, who recommends consult with bariatric surgery for possible complication from previous gastric bypass. [DC]   2040 Lactate: 1.2 [DC]   2055 Discussed case with Dr. Whitley, bariatric surgery, who recommends discussing with patient's surgeon and transfer to [place] for care. [DC]   2155 Repaged bariatric surgery, Dr. Grace. [DC]   2231 We have paged Dr. Grace's surgical group on-call 4 times but have not received return call.  Repaged Dr. Whitley and discussed situation.  He will come in and  recommends taking patient to the OR at this time.  Discussed plan with patient who is agreeable. [DC]      ED Course User Index  [DC] Samantha Cuellar PA  [MW] Demetri Brunner MD             AS OF 23:57 EDT VITALS:    BP - 133/71  HR - 93  TEMP - 97.6 °F (36.4 °C)  O2 SATS - 100%        DIAGNOSIS  Final diagnoses:   Perforation of viscus   History of gastric bypass   Leukocytosis, unspecified type         DISPOSITION  ED Disposition       ED Disposition   Send to OR    Condition   --    Comment   --                  Note Disclaimer: At [facility], we believe that sharing information builds trust and better relationships. You are receiving this note because you recently visited [facility]. It is possible you will see health information before a provider has talked with you about it. This kind of information can be easy to misunderstand. To help you fully understand what it means for your health, we urge you to discuss this note with your provider.         Samantha Cuellar PA  03/28/24 6509      Electronically signed by Demetri Brunner MD at 03/29/24 1643       Demetri Brunner MD at 03/28/24 1842           EMERGENCY DEPARTMENT MD ATTESTATION NOTE    Room Number:  05/05  PCP: Provider, No Known  Independent Historians: Patient and Family    HPI:    Context: Radha Vargas is a 38 y.o. female who presents to the ED c/o acute left lower quadrant abdominal pain that radiates up into her chest and neck.  Symptoms began acutely around 2:00 today.  Patient additionally notes spasms.        Review of prior external notes (non-ED) -and- Review of prior external test results outside of this encounter: Office visit with primary care from 11/22/2023 reviewed and notable for visit secondary to right shoulder pain.  Patient was diagnosed with right rotator cuff tear with plan for operative repair on 12/21.    PHYSICAL EXAM    I have reviewed the triage vital signs and nursing notes.    ED Triage Vitals [03/28/24  1741]   Temp Heart Rate Resp BP SpO2   97.6 °F (36.4 °C) 60 20 117/62 100 %      Temp src Heart Rate Source Patient Position BP Location FiO2 (%)   -- -- -- -- --       Physical Exam  GENERAL: alert, no acute distress  SKIN: Warm, dry  HENT: Normocephalic, atraumatic  EYES: no scleral icterus  CV: regular rhythm, regular rate  RESPIRATORY: normal effort, lungs clear  ABDOMEN: soft, severe tenderness to palpation in the left lower quadrant MUSCULOSKELETAL: no deformity  NEURO: alert, moves all extremities, follows commands            MEDICATIONS GIVEN IN ER  Medications   sodium chloride 0.9 % flush 10 mL (has no administration in time range)   sodium chloride 0.9 % flush 10 mL (has no administration in time range)   sodium chloride 0.9 % bolus 1,000 mL (1,000 mL Intravenous New Bag 3/28/24 1817)   ondansetron (ZOFRAN) injection 4 mg (4 mg Intravenous Given 3/28/24 1817)   morphine injection 4 mg (4 mg Intravenous Given 3/28/24 1816)         ORDERS PLACED DURING THIS VISIT:  Orders Placed This Encounter   Procedures    XR Chest 1 View    CT Abdomen Pelvis With Contrast    [place] Draw    Comprehensive Metabolic Panel    Lipase    hCG, Serum, Qualitative    Urinalysis With Microscopic If Indicated (No Culture) - Urine, Clean Catch    Single High Sensitivity Troponin T    CBC Auto Differential    ECG 12 Lead Other; neck pain    Insert peripheral IV    Insert Peripheral IV    Green Top (Gel)    Lavender Top    Gold Top - SST    Gray Top    Light Blue Top    CBC & Differential         PROGRESS, DATA ANALYSIS, CONSULTS, AND MEDICAL DECISION MAKING  All labs have been independently interpreted by me.  All radiology studies have been reviewed by me. All EKG's have been independently viewed and interpreted by me.  Discussion below represents my analysis of pertinent findings related to patient's condition, differential diagnosis, treatment plan and final disposition.    Differential diagnosis includes but is not limited to  diverticulitis, SBO, volvulus, gastroenteritis.    Clinical Scores:                   ED Course as of 03/28/24 2320   Thu Mar 28, 2024   1833 EKG interpreted by me demonstrates sinus rhythm, rate 83, no ME/QT prolongation, no ST elevation [MW]   1848 Lipase: 29 [DC]   1848 WBC(!): 19.13 [DC]   1848 Hemoglobin: 15.7 [DC]   1848 Platelets: 209 [DC]   1848 Creatinine: 0.85 [DC]   1848 Sodium: 145 [DC]   1848 Potassium: 4.3 [DC]   1848 CO2(!): 21.0 [DC]   1848 HS Troponin T: <6 [DC]   2002 Discussed CT with radiologist, Dr. Connor, who reports free air, possibly gastric in origin is noted. Pt has abnormal anatomy secondary to surgical history. No obvious diverticulitis. [DC]   2007 Amoxicillin caused him to swell.  Will give patient meropenem and vancomycin per pharmacy recommendation.  [DC]   2007 Discussed results with patient and plan for antibiotics and admission.  He reports gastric bypass was performed by Dr. Hansen in [place] about 3 years ago.  She did have a hernia repair performed after the surgery but no other reported complications. [DC]   2039 Discussed case with Dr. Colon, surgery, who recommends consult with bariatric surgery for possible complication from previous gastric bypass. [DC]   2040 Lactate: 1.2 [DC]   2055 Discussed case with Dr. Whitley, bariatric surgery, who recommends discussing with patient's surgeon and transfer to [place] for care. [DC]   2155 Repaged bariatric surgery, Dr. Grace. [DC]   2231 We have paged Dr. Grace's surgical group on-call 4 times but have not received return call.  Repaged Dr. Whitley and discussed situation.  He will come in and recommends taking patient to the OR at this time.  Discussed plan with patient who is agreeable. [DC]      ED Course User Index  [DC] Samantha Cuellar PA  [MW] Demetri Brunner MD       MDM: 38-year-old female presenting for evaluation of severe abdominal pain.  Patient with noted leukocytosis of 20 and severe tenderness to palpation.   Radiological evaluation demonstrates findings concerning for free air and history of bariatric surgery.  There is concern for probable ruptured viscus.  Patient will be initiated on vancomycin and meropenem.  Patient's primary bariatric surgery and has not in town here.  We consulted our bariatric services who recommend transfer to patient's home facility.  We are unable to get a hold of patient's initial surgeon.  Our surgical services graciously agreed to take patient to the OR for emergent intervention.      COMPLEXITY OF CARE  The patient requires admission.    Please note that portions of this document were completed with a voice recognition program.    Note Disclaimer: At [facility], we believe that sharing information builds trust and better relationships. You are receiving this note because you recently visited [facility]. It is possible you will see health information before a provider has talked with you about it. This kind of information can be easy to misunderstand. To help you fully understand what it means for your health, we urge you to discuss this note with your provider.         Demetri Brunner MD  03/28/24 2322      Electronically signed by Demetri Brunner MD at 03/28/24 2322       Radha Morrissey RN at 03/28/24 1740          Pt to ED via EMS with complaints of LLQ pain radiating to L shoulder, feels like spasms per EMS. X4 hours    Electronically signed by Radha Morrissey RN at 03/28/24 1741          Operative/Procedure Notes (last 4 days)        Tate Whitley Jr., MD at 03/28/24 2355          Operation Note    Surgeon: Tate Whitley Jr., M.D.    Assistant: JENSEN Ba    Surgery assisted and facilitated by a certified physician assistant, who directly resulted in a decreased operative time, anesthetic time, wound exposure, and possibly of an operative wound infection, thereby decreasing patient morbidity and ultimately total expenditures. The surgical  assistant assisted in placement of trochars and retraction during the procedure.  Surgical assistant also assisted and closing incisions.      Pre-Operative Diagnosis: Perforated gastrojejunostomy ulcer    Post-Operative Diagnosis: Post-Op Diagnosis Codes:  Same    Procedure Performed: Laparoscopic closure of perforated gastrojejunostomy ulcer with omental patch    Anesthesia:  GETA    Estimated Blood Loss: minimal    Fluids: 1000 mL crystalloid    Specimens: * No orders in the log *    Complications:  None    Indications: 38-year-old female status post laparoscopic Donis-en-Y gastric bypass in [facility] proximately 3 years ago who presented to [facility] emergency room with CT scan revealing free air and inflammation around the gastrojejunostomy.  Patient taken to the operating room for diagnostic laparoscopy.  Informed consent was obtained.    Procedure:     The patient was identified and after informed consent was obtained the patient was taken to the operating room and placed in the supine position.  Patient was given preoperative antibiotics and SCDs were placed by the nursing staff.  After adequate general anesthesia the abdomen was prepped with ChloraPrep and draped in the usual sterile fashion.  A transfer incision was made above into the left of the umbilicus with a scalpel blade using a 5 mm 0 degree laparoscope and a 5 mm Visiport trocar the abdomen was entered under direct visualization without difficulty and a pneumoperitoneum was established with good opening pressures.  General laparoscopic evaluation of the abdominal cavity revealed no injury upon entry with the trocar.  There was purulent fluid noted in the upper abdomen as well as in the pelvis.  3 other 5 mm trocars placed under direct visualization.  2 on the right and 1 on the left.  Russ retractor was placed through the incision in the epigastric region.  The patient was then placed in reverse Trendelenburg.  The purulent fluid  was suctioned up with the suction  and into a suction trap for specimen.  The specimen was sent off to pathology for Gram stain culture and sensitivity.  The left lobe of the liver was freed up before placing the Russ in which adhesions were taken down using the harmonic scalpel.  A perforation was noted just distal to the gastrojejunostomy the Donis limb side.  The perforation measured approximately 1 cm.  The Donis limb was freed of all adhesions as well as the omentum while lying adjacent to this.  The perforation was closed with 2-0 Vicryl sutures in interrupted fashion which were then tied.  A 36 VISI G bougie was placed by anesthesia into the gastric pouch.  The patient was placed in level position and the area was then submerged under saline.  The pouch and Donis limb were then insufflated with air and no air bubbles were seen.  The bougie was then placed in suction and then removed off suction.  2-0 silk sutures were placed in which omentum was brought up over the area and sutured down.  The abdomen was then thoroughly irrigated with 2 L of warm saline and suctioned.  All 4 quadrants were irrigated and washed out.  #19 Alexandre drain was then placed in the abdomen and brought up through the right lateral 5 mm trocar site and secured with a 2-0 nylon suture.  A drain was placed over the gastrojejunostomy underneath the left lobe of liver and around the spleen.  The trocars were then removed under direct visualization.  No bleeding was noted.  The pneumoperitoneum was desufflated.  The incisions were then closed with a 4-0 Monocryl in a subcuticular fashion.  Areas were then cleaned and dried and Exofin placed.  The patient tolerated the procedure well left the operating room in good condition.  Sponges and needles were counted and reported as correct.      Electronically signed by Tate Whitley Jr., MD at 03/29/24 0058          Physician Progress Notes (last 4 days)        Kimo Child  MD [place] at 04/01/24 1025          ID NOTE    CC: f/u perforated viscus    Subj: No fever.  Slowly increasing her intake.  Having flatus.  Eager to switch to oral antibiotics and discharge.    Medications:    Current Facility-Administered Medications:     acetaminophen (TYLENOL) tablet 650 mg, 650 mg, Oral, Q4H PRN, Tate Whitley Jr., MD, 650 mg at 04/01/24 0731    albuterol (PROVENTIL) nebulizer solution 0.083% 2.5 mg/3mL, 2.5 mg, Nebulization, Q4H PRN, Tate Whitley Jr., MD    amisulpride (antiemetic) (BARHEMSYS) injection 10 mg, 10 mg, Intravenous, Once PRN, Ttae Whitley Jr., MD    cefTRIAXone (ROCEPHIN) 2,000 mg in sodium chloride 0.9 % 100 mL MBP, 2,000 mg, Intravenous, Q24H, Kmio Child MD, Last Rate: 200 mL/hr at 03/31/24 1425, 2,000 mg at 03/31/24 1425    diphenhydrAMINE (BENADRYL) injection 25 mg, 25 mg, Intravenous, Q4H PRN, Tate Whitley Jr., MD    Enoxaparin Sodium (LOVENOX) syringe 40 mg, 40 mg, Subcutaneous, Q24H, Tate Whitley Jr., MD, 40 mg at 03/31/24 0947    fluconazole (DIFLUCAN) IVPB 400 mg, 400 mg, Intravenous, Q24H, Kimo Child MD, Last Rate: 100 mL/hr at 04/01/24 0741, 400 mg at 04/01/24 0741    hydrALAZINE (APRESOLINE) injection 10 mg, 10 mg, Intravenous, Q30 Min PRN, Tate Whitley Jr., MD    HYDROmorphone (DILAUDID) tablet 2 mg, 2 mg, Oral, Q4H PRN, Tate Whitley Jr., MD, 2 mg at 04/01/24 0731    lactated ringers infusion, 75 mL/hr, Intravenous, Continuous, Tate Whitley Jr., MD, Last Rate: 75 mL/hr at 03/31/24 2108, 75 mL/hr at 03/31/24 2108    metoclopramide (REGLAN) injection 10 mg, 10 mg, Intravenous, Q6H, Tate Whitley Jr., MD, 10 mg at 04/01/24 0333    metroNIDAZOLE (FLAGYL) IVPB 500 mg, 500 mg, Intravenous, Q8H, Kimo Child MD, Last Rate: 200 mL/hr at 04/01/24 0619, 500 mg at 04/01/24 0619    [DISCONTINUED] HYDROmorphone (DILAUDID) injection 0.5 mg, 0.5 mg, Intravenous, Q2H PRN,  0.5 mg at 03/30/24 1037 **AND** naloxone (NARCAN) injection 0.1 mg, 0.1 mg, Intravenous, Q5 Min PRN, Tate Whitley Jr., MD    ondansetron ODT (ZOFRAN-ODT) disintegrating tablet 4 mg, 4 mg, Oral, Q4H PRN **OR** ondansetron (ZOFRAN) injection 4 mg, 4 mg, Intravenous, Q4H PRN, Tate Whitley Jr., MD    pantoprazole (PROTONIX) EC tablet 40 mg, 40 mg, Oral, Q AM, Tate Whitley Jr., MD, 40 mg at 04/01/24 0619    phenol (CHLORASEPTIC) 1.4 % liquid 1 spray, 1 spray, Mouth/Throat, Q2H PRN, Tate Whitley Jr., MD    prochlorperazine (COMPAZINE) injection 10 mg, 10 mg, Intravenous, Q6H PRN, Tate Whitley Jr., MD    promethazine (PHENERGAN) tablet 12.5 mg, 12.5 mg, Oral, Q4H PRN **OR** promethazine (PHENERGAN) suppository 12.5 mg, 12.5 mg, Rectal, Q4H PRN, Tate Whitley Jr., MD    sucralfate (CARAFATE) tablet 1 g, 1 g, Oral, 4x Daily AC & at Bedtime, Tate Whitley Jr., MD, 1 g at 04/01/24 0741      Objective   Vital Signs   Temp:  [97.9 °F (36.6 °C)-99.1 °F (37.3 °C)] 97.9 °F (36.6 °C)  Heart Rate:  [66-90] 66  Resp:  [16-18] 18  BP: (102-138)/(72-79) 122/72    Physical Exam:   General: awake, more alert and comfortable today  Eyes: no scleral icterus  Cardiovascular: Normal rate  Respiratory: normal work of breathing on room air  GI: Abdomen bandaged with MARVEL drain present  :  no Rodgers catheter  Skin: No rashes  Neurological: Alert and oriented x 3  Psychiatric: Normal mood and affect     Labs:   Blood culture and body fluid culture reviewed today  Lab Results   Component Value Date    WBC 5.48 03/31/2024    HGB 9.9 (L) 03/31/2024    HCT 29.2 (L) 03/31/2024    MCV 86.4 03/31/2024     (L) 03/31/2024       Lab Results   Component Value Date    GLUCOSE 86 03/31/2024    BUN 6 03/31/2024    CREATININE 0.58 03/31/2024    BCR 10.3 03/31/2024    CO2 24.0 03/31/2024    CALCIUM 7.9 (L) 03/31/2024    ALBUMIN 3.0 (L) 03/31/2024    AST 23 03/31/2024    ALT 35 (H) 03/31/2024     Lab Results    Component Value Date    CRP 24.13 (H) 03/30/2024     Microbiology:  3/28 BCx: Negative to date  3/29 abdominal wall culture: Rare amounts of coagulase-negative staph and alphahemolytic Streptococcus    Prior Radiology:  CT abdomen/pelvis with findings consistent with perforated viscus    ASSESSMENT/PLAN:  Perforated viscus  Leukocytosis - resolved  History of Donis-en-Y gastric bypass surgery  Amoxicillin allergy    She is afebrile with normal white blood cell count.  She is tolerating soft foods.  She is eager for discharge and has been cleared by her surgeon and I have been asked if that is okay from infectious diseases standpoint.  She will get her doses of intravenous ceftriaxone and fluconazole here today.    I would recommend that we continue her on antibiotics through 4/12/2024.  I will switch the regimen to something similar that she can take by mouth.  I recommend cefdinir oral solution 300 mg every 12 hours, fluconazole oral solution 400 mg every 24 hours, and Flagyl 500 mg p.o. every 8 hours.    Will discuss with pharmacist to make sure all of these are available and ask whether or not the Flagyl can be crushed.    If her primary team could please send these prescriptions meds to bed at the time of discharge/medication reconciliation that I would greatly appreciated.  I have the correct stop date set.    Discussed with nurse.    Thank you for allowing me to be involved in the care of this patient. Infectious diseases will sign off at this time with antibiotics plan in place, but please call me at [phone] if any further ID questions or new ID concerns.      Electronically signed by Kimo Child MD at 04/01/24 1031       Tate Whitley Jr., MD at 04/01/24 0640             LOS: 2 days   Patient Care Team:  Provider, No Known as PCP - General    Chief Complaint: Headache    Interval History:   Patient Denies: Nausea, vomiting, chest pain, shortness of air  Patient states she is doing  well with positive flatus but no bowel movement      Vital Signs  Temp:  [97.9 °F (36.6 °C)-99.1 °F (37.3 °C)] 97.9 °F (36.6 °C)  Heart Rate:  [66-90] 66  Resp:  [16-18] 18  BP: (102-138)/(72-79) 122/72      Physical Exam:   Heart: RR   Abd: Soft and nondistended; incisions clean, dry intact without erythema; Alexandre drain with serous output   Ext:  No clubbing, cyanosis     Results Review:     I reviewed the patient's new clinical results.    Lab Results (last 24 hours)       Procedure Component Value Units Date/Time    Blood Culture - Blood, Hand, Right [[id number]]  (Normal) Collected: 03/28/24 2042    Specimen: Blood from Hand, Right Updated: 03/31/24 2100     Blood Culture No growth at 3 days    Blood Culture - Blood, Arm, Right [[id number]]  (Normal) Collected: 03/28/24 2042    Specimen: Blood from Arm, Right Updated: 03/31/24 2100     Blood Culture No growth at 3 days            Assessment/Plan:    Perforated bowel    History of gastric bypass    Perforation of viscus    Leukocytosis      38 y.o. female postop day 3 status post laparoscopic closure of gastrojejunostomy perforation with omental patch.  Patient remains afebrile and hemodynamically stable and abdomen soft on exam.  Patient tolerating full liquid diet.  Patient with positive flatus.  White count normal.  Alexandre drain with increased output but still serous without any purulent drainage.  Will try liquid colored drink to see if any noticed in the drain.  If does well today and no evidence of leak then would be okay to discharge home from my standpoint but we will see what infectious disease recommends.      Tate Whitley Jr., MD  Medical Director [facility] Weight Loss  [facility]-Bariatric Surgery    04/01/24          Electronically signed by Tate Whitley Jr., MD at 04/01/24 0946       Tate Whitley Jr., MD at 03/31/24 1111             LOS: 1 day   Patient Care Team:  Provider, No Known as PCP -  General    Chief Complaint:  none    Interval History:   Patient Denies:  n/v/cp/soa  Patient doing better. + flatus      Vital Signs  Temp:  [97.7 °F (36.5 °C)-98.8 °F (37.1 °C)] 97.7 °F (36.5 °C)  Heart Rate:  [76-89] 78  Resp:  [16] 16  BP: ()/(55-71) 99/61      Physical Exam:   Heart: RR   Abd:  soft and nd; incisions c/d/i   Ext:  No clubbing, cyanosis     Results Review:     I reviewed the patient's new clinical results.    Lab Results (last 24 hours)       Procedure Component Value Units Date/Time    Body Fluid Culture - Body Fluid, Abdominal Wall [[id number]]  (Abnormal) Collected: 03/29/24 0030    Specimen: Body Fluid from Abdominal Wall Updated: 03/31/24 0800     Body Fluid Culture Rare Staphylococcus, coagulase negative      Rare Streptococcus, Alpha Hemolytic     Gram Stain Moderate (3+) WBCs per low power field      Few (2+) Gram positive cocci in pairs and clusters    Narrative:      3/30: all scant/rare normal duodenal pattie    Comprehensive Metabolic Panel [[id number]]  (Abnormal) Collected: 03/31/24 0501    Specimen: Blood Updated: 03/31/24 0611     Glucose 86 mg/dL      BUN 6 mg/dL      Creatinine 0.58 mg/dL      Sodium 138 mmol/L      Potassium 3.4 mmol/L      Chloride 104 mmol/L      CO2 24.0 mmol/L      Calcium 7.9 mg/dL      Total Protein 5.0 g/dL      Albumin 3.0 g/dL      ALT (SGPT) 35 U/L      AST (SGOT) 23 U/L      Alkaline Phosphatase 83 U/L      Total Bilirubin 0.6 mg/dL      Globulin 2.0 gm/dL      A/G Ratio 1.5 g/dL      BUN/Creatinine Ratio 10.3     Anion Gap 10.0 mmol/L      eGFR 119.0 mL/min/1.73     Narrative:      GFR Normal >60  Chronic Kidney Disease <60  Kidney Failure <15      CBC (No Diff) [[id number]]  (Abnormal) Collected: 03/31/24 0501    Specimen: Blood Updated: 03/31/24 0555     WBC 5.48 10*3/mm3      RBC 3.38 10*6/mm3      Hemoglobin 9.9 g/dL      Hematocrit 29.2 %      MCV 86.4 fL      MCH 29.3 pg      MCHC 33.9 g/dL      RDW 13.0 %      RDW-SD 40.8 fl      MPV 12.0  fL      Platelets 113 10*3/mm3     Blood Culture - Blood, Arm, Right [[id number]]  (Normal) Collected: 03/28/24 2042    Specimen: Blood from Arm, Right Updated: 03/30/24 2100     Blood Culture No growth at 2 days    Blood Culture - Blood, Hand, Right [[id number]]  (Normal) Collected: 03/28/24 2042    Specimen: Blood from Hand, Right Updated: 03/30/24 2100     Blood Culture No growth at 2 days    C-reactive Protein [[id number]]  (Abnormal) Collected: 03/30/24 0832    Specimen: Blood Updated: 03/30/24 1253     C-Reactive Protein 24.13 mg/dL             Assessment/Plan:    Perforated bowel    History of gastric bypass    Perforation of viscus    Leukocytosis      38 y.o. female  pod#2  Doing well and remains afebrile and hd stable; abd soft and flatus. Appreciate ID help. Advance full liquid diet. Hopefully am tomorrow if okay with ID.      Tate Whitley Jr., MD  Medical Director [facility] Loss  [facility]-Bariatric Surgery    03/31/24          Electronically signed by Tate Whitley Jr., MD at 03/31/24 1111       Kimo Child MD at 03/31/24 0907          ID NOTE    CC: f/u perforated viscus    Subj: No fever. Took in some broth this AM. She reports a HA.    Medications:    Current Facility-Administered Medications:     acetaminophen (TYLENOL) tablet 650 mg, 650 mg, Oral, Q4H PRN, Tate Whitley Jr., MD, 650 mg at 03/31/24 0122    albuterol (PROVENTIL) nebulizer solution 0.083% 2.5 mg/3mL, 2.5 mg, Nebulization, Q4H PRN, Tate Whitley Jr., MD    amisulpride (antiemetic) (BARHEMSYS) injection 10 mg, 10 mg, Intravenous, Once PRN, Tate Whitley Jr., MD    cefTRIAXone (ROCEPHIN) 2,000 mg in sodium chloride 0.9 % 100 mL MBP, 2,000 mg, Intravenous, Q24H, Kimo Child MD, Last Rate: 200 mL/hr at 03/30/24 1421, 2,000 mg at 03/30/24 1421    diphenhydrAMINE (BENADRYL) injection 25 mg, 25 mg, Intravenous, Q4H PRN, Tate Whitley Jr.,  MD    Enoxaparin Sodium (LOVENOX) syringe 40 mg, 40 mg, Subcutaneous, Q24H, Tate Whitley Jr., MD, 40 mg at 03/30/24 1037    fluconazole (DIFLUCAN) IVPB 400 mg, 400 mg, Intravenous, Q24H, Kimo Child MD, Last Rate: 100 mL/hr at 03/31/24 0802, 400 mg at 03/31/24 0802    hydrALAZINE (APRESOLINE) injection 10 mg, 10 mg, Intravenous, Q30 Min PRN, Tate Whitley Jr., MD    HYDROmorphone (DILAUDID) tablet 2 mg, 2 mg, Oral, Q4H PRN, Tate Whitley Jr., MD, 2 mg at 03/31/24 0524    lactated ringers infusion, 100 mL/hr, Intravenous, Continuous, Tate Whitley Jr., MD, Last Rate: 100 mL/hr at 03/31/24 0804, 100 mL/hr at 03/31/24 0804    metoclopramide (REGLAN) injection 10 mg, 10 mg, Intravenous, Q6H, Tate Whitley Jr., MD, 10 mg at 03/31/24 0524    metroNIDAZOLE (FLAGYL) IVPB 500 mg, 500 mg, Intravenous, Q8H, Kimo Child MD, Last Rate: 200 mL/hr at 03/31/24 0524, 500 mg at 03/31/24 0524    [DISCONTINUED] HYDROmorphone (DILAUDID) injection 0.5 mg, 0.5 mg, Intravenous, Q2H PRN, 0.5 mg at 03/30/24 1037 **AND** naloxone (NARCAN) injection 0.1 mg, 0.1 mg, Intravenous, Q5 Min PRN, Tate Whitley Jr., MD    ondansetron ODT (ZOFRAN-ODT) disintegrating tablet 4 mg, 4 mg, Oral, Q4H PRN **OR** ondansetron (ZOFRAN) injection 4 mg, 4 mg, Intravenous, Q4H PRN, Tate Whitley Jr., MD    pantoprazole (PROTONIX) injection 40 mg, 40 mg, Intravenous, Q12H, Tate Whitley Jr., MD, 40 mg at 03/31/24 0803    phenol (CHLORASEPTIC) 1.4 % liquid 1 spray, 1 spray, Mouth/Throat, Q2H PRN, Tate Whitley Jr., MD    prochlorperazine (COMPAZINE) injection 10 mg, 10 mg, Intravenous, Q6H PRN, Tate Whitley Jr., MD    promethazine (PHENERGAN) tablet 12.5 mg, 12.5 mg, Oral, Q4H PRN **OR** promethazine (PHENERGAN) suppository 12.5 mg, 12.5 mg, Rectal, Q4H PRN, Tate Whitley Jr., MD      Objective   Vital Signs   Temp:  [97.7 °F (36.5 °C)-98.8 °F (37.1 °C)] 97.7 °F (36.5  °C)  Heart Rate:  [76-89] 78  Resp:  [16] 16  BP: ()/(55-71) 99/61    Physical Exam:   General: awake, clearly does not feel well, sitting up in bed  Eyes: no scleral icterus  Cardiovascular: NR  Respiratory: normal work of breathing on RA  GI: Abdomen bandaged with MARVEL drain present  :  no Rodgers catheter  Skin: No rashes  Neurological: Alert and oriented x 3  Psychiatric: Normal mood and affect   Vasc: PIV w/o erythema    Labs:   CBC, CMP, CRP, and body fluid culture reviewed today  Lab Results   Component Value Date    WBC 5.48 03/31/2024    HGB 9.9 (L) 03/31/2024    HCT 29.2 (L) 03/31/2024    MCV 86.4 03/31/2024     (L) 03/31/2024       Lab Results   Component Value Date    GLUCOSE 86 03/31/2024    BUN 6 03/31/2024    CREATININE 0.58 03/31/2024    BCR 10.3 03/31/2024    CO2 24.0 03/31/2024    CALCIUM 7.9 (L) 03/31/2024    ALBUMIN 3.0 (L) 03/31/2024    AST 23 03/31/2024    ALT 35 (H) 03/31/2024     Lab Results   Component Value Date    CRP 24.13 (H) 03/30/2024     Microbiology:  3/28 BCx: Negative to date  3/29 abdominal wall culture: Rare amounts of coagulase-negative staph and alphahemolytic Streptococcus    Prior Radiology:  CT abdomen/pelvis with findings consistent with perforated viscus    ASSESSMENT/PLAN:  Perforated viscus  Leukocytosis - resolved  History of Donis-en-Y gastric bypass surgery  Amoxicillin allergy    Thanks to bariatric surgeon for his prompt intervention to help provide source control.  I recommend that we continue ceftriaxone 2 g IV every 24 hours, Flagyl 500 mg IV every 8 hours, and fluconazole 400 mg IV every 24 hours with the duration to be determined.   It is good to see that her WBC has normalized.  She is hoping she can eat more soon and work towards getting out of the hospital in the coming days.  I think it should be able to transition her to an oral antibiotic regiment and discharge if she is improved and we are beginning to trust her absorption.    Check CBC and BMP  in the a.m. for close monitoring while on broad-spectrum antibiotics.    ID will follow.       Electronically signed by Kimo Child MD at 03/31/24 0912       Tate Whitley Jr., MD at 03/30/24 0925             LOS: 0 days   Patient Care Team:  Provider, No Known as PCP - General    Chief Complaint: Headache    Interval History:   Patient Denies: Nausea, vomiting, chest pain, shortness of air, lower extremity pain  Patient states she is feeling okay except for headache this morning      Vital Signs  Temp:  [97.5 °F (36.4 °C)-98.8 °F (37.1 °C)] 98.8 °F (37.1 °C)  Heart Rate:  [66-86] 75  Resp:  [16] 16  BP: ()/(51-61) 98/61      Physical Exam:   Heart: RR   Abd: Soft and nondistended; incisions   Ext:  No clubbing, cyanosis     Results Review:     I reviewed the patient's new clinical results.    Lab Results (last 24 hours)       Procedure Component Value Units Date/Time    Basic Metabolic Panel [[id number]]  (Abnormal) Collected: 03/30/24 0832    Specimen: Blood Updated: 03/30/24 0902     Glucose 101 mg/dL      BUN 9 mg/dL      Creatinine 0.60 mg/dL      Sodium 138 mmol/L      Potassium 4.3 mmol/L      Chloride 107 mmol/L      CO2 24.6 mmol/L      Calcium 8.6 mg/dL      BUN/Creatinine Ratio 15.0     Anion Gap 6.4 mmol/L      eGFR 118.0 mL/min/1.73     Narrative:      GFR Normal >60  Chronic Kidney Disease <60  Kidney Failure <15      CBC (No Diff) [[id number]]  (Abnormal) Collected: 03/30/24 0832    Specimen: Blood Updated: 03/30/24 0845     WBC 9.21 10*3/mm3      RBC 3.57 10*6/mm3      Hemoglobin 10.4 g/dL      Hematocrit 30.0 %      MCV 84.0 fL      MCH 29.1 pg      MCHC 34.7 g/dL      RDW 13.0 %      RDW-SD 40.0 fl      MPV 11.7 fL      Platelets 121 10*3/mm3     Blood Culture - Blood, Arm, Right [[id number]]  (Normal) Collected: 03/28/24 2042    Specimen: Blood from Arm, Right Updated: 03/29/24 2100     Blood Culture No growth at 24 hours    Blood Culture - Blood, Hand, Right [[id number]]   (Normal) Collected: 03/28/24 2042    Specimen: Blood from Hand, Right Updated: 03/29/24 2100     Blood Culture No growth at 24 hours    Body Fluid Culture - Body Fluid, Abdominal Wall [[id number]] Collected: 03/29/24 0030    Specimen: Body Fluid from Abdominal Wall Updated: 03/29/24 1359     Body Fluid Culture Abnormal Stain     Gram Stain Moderate (3+) WBCs per low power field      Few (2+) Gram positive cocci in pairs and clusters            Assessment/Plan:    Perforated bowel    History of gastric bypass    Perforation of viscus    Leukocytosis      38 y.o. female postop day 1 status post laparoscopic closure of gastrojejunostomy perforation who is doing well.  Patient remains afebrile and hemodynamically stable and abdomen soft on exam.  White blood cell count normalized today.  Start on bariatric stage I diet today.  Will consult infectious disease for antibiotic choice and duration.  Encourage increase ambulation and incentive spirometer.  Continue with PPI.  Continue with SCDs and will add Lovenox for DVT prophylaxis.      Tate Whitley Jr., MD  Medical Director [facility] Weight Loss  [facility]-Bariatric Surgery    03/30/24          Electronically signed by Tate Whitley Jr., MD at 03/30/24 0927       Tate Whitley Jr., MD at 03/29/24 0813             LOS: 0 days   Patient Care Team:  Provider, No Known as PCP - General    Chief Complaint: Abdominal soreness but feels much better    Interval History:   Patient Denies: Nausea, vomiting, chest pain, shortness of air, lower extremity pain  Patient states that she is feeling better only with abdominal soreness      Vital Signs  Temp:  [97.6 °F (36.4 °C)-98.9 °F (37.2 °C)] 98.7 °F (37.1 °C)  Heart Rate:  [] 67  Resp:  [12-20] 16  BP: (109-147)/() 110/61      Physical Exam:   Heart: RR   Abd: Soft and nondistended; incisions clean, dry intact without erythema; Alexandre drain with serosanguineous  output   Ext:  No clubbing, cyanosis     Results Review:     I reviewed the patient's new clinical results.    Lab Results (last 24 hours)       Procedure Component Value Units Date/Time    Comprehensive Metabolic Panel [[id number]]  (Abnormal) Collected: 03/29/24 0552    Specimen: Blood Updated: 03/29/24 0659     Glucose 161 mg/dL      BUN 10 mg/dL      Creatinine 0.83 mg/dL      Sodium 143 mmol/L      Potassium 4.4 mmol/L      Chloride 112 mmol/L      CO2 20.0 mmol/L      Calcium 8.3 mg/dL      Total Protein 5.9 g/dL      Albumin 3.7 g/dL      ALT (SGPT) 67 U/L      AST (SGOT) 50 U/L      Alkaline Phosphatase 54 U/L      Total Bilirubin 0.6 mg/dL      Globulin 2.2 gm/dL      A/G Ratio 1.7 g/dL      BUN/Creatinine Ratio 12.0     Anion Gap 11.0 mmol/L      eGFR 92.7 mL/min/1.73     Narrative:      GFR Normal >60  Chronic Kidney Disease <60  Kidney Failure <15      Phosphorus [[id number]]  (Normal) Collected: 03/29/24 0552    Specimen: Blood Updated: 03/29/24 0659     Phosphorus 3.2 mg/dL     Magnesium [[id number]]  (Normal) Collected: 03/29/24 0552    Specimen: Blood Updated: 03/29/24 0659     Magnesium 1.6 mg/dL     CBC & Differential [[id number]]  (Abnormal) Collected: 03/29/24 0552    Specimen: Blood Updated: 03/29/24 0640    Narrative:      The following orders were created for panel order CBC & Differential.  Procedure                               Abnormality         Status                     ---------                               -----------         ------                     CBC Auto Differential[[id number]]        Abnormal            Final result                 Please view results for these tests on the individual orders.    CBC Auto Differential [[id number]]  (Abnormal) Collected: 03/29/24 0552    Specimen: Blood Updated: 03/29/24 0640     WBC 16.73 10*3/mm3      RBC 4.36 10*6/mm3      Hemoglobin 12.3 g/dL      Hematocrit 36.8 %      MCV 84.4 fL      MCH 28.2 pg      MCHC 33.4 g/dL      RDW 13.0 %      RDW-SD  39.8 fl      MPV 12.0 fL      Platelets 162 10*3/mm3      Neutrophil % 94.3 %      Lymphocyte % 2.3 %      Monocyte % 2.5 %      Eosinophil % 0.0 %      Basophil % 0.1 %      Immature Grans % 0.8 %      Neutrophils, Absolute 15.79 10*3/mm3      Lymphocytes, Absolute 0.38 10*3/mm3      Monocytes, Absolute 0.42 10*3/mm3      Eosinophils, Absolute 0.00 10*3/mm3      Basophils, Absolute 0.01 10*3/mm3      Immature Grans, Absolute 0.13 10*3/mm3      nRBC 0.0 /100 WBC     Body Fluid Culture - Body Fluid, Abdominal Wall [[id number]] Collected: 03/29/24 0030    Specimen: Body Fluid from Abdominal Wall Updated: 03/29/24 0129    [place] Draw [[id number]] Collected: 03/28/24 1758    Specimen: Blood Updated: 03/28/24 2201    Narrative:      The following orders were created for panel order [place] Draw.  Procedure                               Abnormality         Status                     ---------                               -----------         ------                     Green Top (Gel)[[id number]]                                  Final result               Lavender Top[[id number]]                                     Final result               Gold Top - SST[[id number]]                                   Final result               [place] Top[[id number]]                                         Final result               Light Blue Top[[id number]]                                   Final result                 Please view results for these tests on the individual orders.    [place] Top [[id number]] Collected: 03/28/24 1758    Specimen: Blood Updated: 03/28/24 2201     Extra Tube Hold for add-ons.     Comment: Auto resulted.       Urinalysis With Microscopic If Indicated (No Culture) - Urine, Clean Catch [[id number]]  (Abnormal) Collected: 03/28/24 2106    Specimen: Urine, Clean Catch Updated: 03/28/24 2118     Color, UA Yellow     Appearance, UA Clear     pH, UA <=5.0     Specific Gravity, UA >=1.030     Glucose, UA Negative     Ketones, UA  Trace     Bilirubin, UA Negative     Blood, UA Negative     Protein, UA Negative     Leuk Esterase, UA Negative     Nitrite, UA Negative     Urobilinogen, UA 0.2 E.U./dL    Narrative:      Urine microscopic not indicated.    Blood Culture - Blood, Hand, Right [[id number]] Collected: 03/28/24 2042    Specimen: Blood from Hand, Right Updated: 03/28/24 2048    Blood Culture - Blood, Arm, Right [[id number]] Collected: 03/28/24 2042    Specimen: Blood from Arm, Right Updated: 03/28/24 2048    Lactic Acid, Plasma [[id number]]  (Normal) Collected: 03/28/24 1758    Specimen: Blood Updated: 03/28/24 2020     Lactate 1.2 mmol/L     Lavender Top [[id number]] Collected: 03/28/24 1758    Specimen: Blood Updated: 03/28/24 1901     Extra Tube hold for add-on     Comment: Auto resulted       Gold Top - SST [[id number]] Collected: 03/28/24 1758    Specimen: Blood Updated: 03/28/24 1901     Extra Tube Hold for add-ons.     Comment: Auto resulted.       Light Blue Top [[id number]] Collected: 03/28/24 1758    Specimen: Blood Updated: 03/28/24 1901     Extra Tube Hold for add-ons.     Comment: Auto resulted       Green Top (Gel) [[id number]] Collected: 03/28/24 1758    Specimen: Blood Updated: 03/28/24 1848     Extra Tube Hold for add-ons.     Comment: Auto resulted.       hCG, Serum, Qualitative [[id number]]  (Normal) Collected: 03/28/24 1758    Specimen: Blood Updated: 03/28/24 1841     HCG Qualitative Negative    Comprehensive Metabolic Panel [[id number]]  (Abnormal) Collected: 03/28/24 1758    Specimen: Blood Updated: 03/28/24 1839     Glucose 105 mg/dL      BUN 11 mg/dL      Creatinine 0.85 mg/dL      Sodium 145 mmol/L      Potassium 4.3 mmol/L      Chloride 111 mmol/L      CO2 21.0 mmol/L      Calcium 9.1 mg/dL      Total Protein 6.7 g/dL      Albumin 4.5 g/dL      ALT (SGPT) 15 U/L      AST (SGOT) 15 U/L      Alkaline Phosphatase 71 U/L      Total Bilirubin 0.6 mg/dL      Globulin 2.2 gm/dL      A/G Ratio 2.0 g/dL      BUN/Creatinine  Ratio 12.9     Anion Gap 13.0 mmol/L      eGFR 90.1 mL/min/1.73     Narrative:      GFR Normal >60  Chronic Kidney Disease <60  Kidney Failure <15      Lipase [[id number]]  (Normal) Collected: 03/28/24 1758    Specimen: Blood Updated: 03/28/24 1839     Lipase 29 U/L     Single High Sensitivity Troponin T [[id number]]  (Normal) Collected: 03/28/24 1758    Specimen: Blood Updated: 03/28/24 1839     HS Troponin T <6 ng/L     Narrative:      High Sensitive Troponin T Reference Range:  <14.0 ng/L- Negative Female for AMI  <22.0 ng/L- Negative Male for AMI  >=14 - Abnormal Female indicating possible myocardial injury.  >=22 - Abnormal Male indicating possible myocardial injury.   Clinicians would have to utilize clinical acumen, EKG, Troponin, and serial changes to determine if it is an Acute Myocardial Infarction or myocardial injury due to an underlying chronic condition.         CBC & Differential [[id number]]  (Abnormal) Collected: 03/28/24 1758    Specimen: Blood Updated: 03/28/24 1812    Narrative:      The following orders were created for panel order CBC & Differential.  Procedure                               Abnormality         Status                     ---------                               -----------         ------                     CBC Auto Differential[[id number]]        Abnormal            Final result                 Please view results for these tests on the individual orders.    CBC Auto Differential [[id number]]  (Abnormal) Collected: 03/28/24 1758    Specimen: Blood Updated: 03/28/24 1812     WBC 19.13 10*3/mm3      RBC 5.29 10*6/mm3      Hemoglobin 15.7 g/dL      Hematocrit 45.4 %      MCV 85.8 fL      MCH 29.7 pg      MCHC 34.6 g/dL      RDW 13.4 %      RDW-SD 42.1 fl      MPV 12.2 fL      Platelets 209 10*3/mm3      Neutrophil % 90.1 %      Lymphocyte % 5.8 %      Monocyte % 3.1 %      Eosinophil % 0.3 %      Basophil % 0.3 %      Immature Grans % 0.4 %      Neutrophils, Absolute 17.24 10*3/mm3       "Lymphocytes, Absolute 1.10 10*3/mm3      Monocytes, Absolute 0.60 10*3/mm3      Eosinophils, Absolute 0.05 10*3/mm3      Basophils, Absolute 0.06 10*3/mm3      Immature Grans, Absolute 0.08 10*3/mm3      nRBC 0.0 /100 WBC             Assessment/Plan:    Perforated bowel    History of gastric bypass    Perforation of viscus    Leukocytosis      38 y.o. female postop day 0 status post laparoscopic repair of perforated gastrojejunostomy ulcer with omental patch.  Patient remains afebrile and hemodynamically stable with white blood cell count trending down.  Abdomen soft on exam the patient feeling better.  Will do ice chips today and plan for liquid diet tomorrow as long as she progresses.  Continue with ambulation, SCDs and incentive spirometer.  Continue IV antibiotics and await cultures.  Continue with PPI      Tate Whitley Jr., MD  Medical Director [facility] Loss  [facility]-Bariatric Surgery    03/29/24          Electronically signed by Tate Whitley Jr., MD at 03/29/24 0814          Consult Notes (last 4 days)        Kimo Child MD at 03/30/24 1059        Consult Orders    1. Inpatient Infectious Diseases Consult [[id number]] ordered by Tate Whitley Jr., MD at 03/30/24 0924                 Referring Provider: Tate Whitley Jr., MD  [address] [place] [address]    Reason for Consultation: ABX management    History of present illness:  Radha Vargas is a 38 y.o. who I am asked to evaluate and give opinion for \"ABX management\". History is obtained from the patient and review of the old medical records which I summarize/synthesize as follows: About 3 years ago she underwent laparoscopic Donis-en-Y gastric bypass in [place].  She says that once in the past she had sepsis associated with an abscess in this area.  She and her boyfriend are currently visiting here on to get away from [place], [place].  Unfortunately " for her she presented to our emergency room on 3/28/2024 with sharp left lower quadrant pain and abdominal cramping.  This was rather sudden in onset.  No associated fever.  No alleviating factors.    In the emergency room, she was afebrile with a normal heart rate and blood pressure.  Labs notable for WBC 19, lactate 1.2, and lipase 29.  She had a CT scan done that showed enteritis and free enter peritoneal air in the upper abdomen with gas bubbles present at the operative site.  All of this was concerning for perforated viscus.  She was started on meropenem and fluconazole.    Therefore on 3/28/2024 she underwent laparoscopic closure of perforated gastrojejunostomy ulcer with omental patch.    I reviewed the bariatric surgeons note.  She is improving.  Her WBC has normalized.  She is afebrile and on room air.  It looks like an abdominal wall body fluid culture was sent yesterday and is showing rare amounts of coagulase-negative staph and rare amounts of alphahemolytic Streptococcus.  It is unclear where this was sent from.  It was possibly sent from her drain.      Past Surgical History:   Procedure Laterality Date    ABDOMINAL SURGERY      DIAGNOSTIC LAPAROSCOPY N/A 3/28/2024    Procedure: DIAGNOSTIC LAPAROSCOPY with laparoscopic closure of ulcer and corinne patch;  Surgeon: Tate Whitley Jr., MD;  Location: [facility];  Service: General;  Laterality: N/A;    HERNIA REPAIR         Social History:  + vaping  Lives in [place], [place]    Antibiotic allergies and intolerances:    Penicillin  2.  Sulfa  Both caused hives    Medications:    Current Facility-Administered Medications:     albuterol (PROVENTIL) nebulizer solution 0.083% 2.5 mg/3mL, 2.5 mg, Nebulization, Q4H PRN, Tate Whitley Jr., MD    amisulpride (antiemetic) (BARHEMSYS) injection 10 mg, 10 mg, Intravenous, Once PRN, Tate Whitley Jr., MD    diphenhydrAMINE (BENADRYL) injection 25 mg, 25 mg, Intravenous, Q4H PRN, Tate Whitley  Sohan Merritt MD    Enoxaparin Sodium (LOVENOX) syringe 40 mg, 40 mg, Subcutaneous, Q24H, Tate Whitley Jr., MD, 40 mg at 03/30/24 1037    fluconazole (DIFLUCAN) IVPB 200 mg, 200 mg, Intravenous, Q24H, Tate Whitley Jr., MD, Last Rate: 100 mL/hr at 03/30/24 0837, 200 mg at 03/30/24 0837    hydrALAZINE (APRESOLINE) injection 10 mg, 10 mg, Intravenous, Q30 Min PRN, Tate Whitley Jr., MD    HYDROmorphone (DILAUDID) injection 0.5 mg, 0.5 mg, Intravenous, Q2H PRN, 0.5 mg at 03/30/24 1037 **AND** naloxone (NARCAN) injection 0.1 mg, 0.1 mg, Intravenous, Q5 Min PRN, Tate Whitley Jr., MD    lactated ringers infusion, 100 mL/hr, Intravenous, Continuous, Tate Whitley Jr., MD, Last Rate: 100 mL/hr at 03/30/24 1037, 100 mL/hr at 03/30/24 1037    meropenem (MERREM) 500 mg in sodium chloride 0.9 % 100 mL MBP, 500 mg, Intravenous, Q6H, Tate Whitley Jr., MD, 500 mg at 03/30/24 1037    metoclopramide (REGLAN) injection 10 mg, 10 mg, Intravenous, Q6H, Tate Whitley Jr., MD, 10 mg at 03/30/24 1037    ondansetron ODT (ZOFRAN-ODT) disintegrating tablet 4 mg, 4 mg, Oral, Q4H PRN **OR** ondansetron (ZOFRAN) injection 4 mg, 4 mg, Intravenous, Q4H PRN, Tate Whitley Jr., MD    pantoprazole (PROTONIX) injection 40 mg, 40 mg, Intravenous, Q12H, Tate Whitley Jr., MD, 40 mg at 03/30/24 0836    phenol (CHLORASEPTIC) 1.4 % liquid 1 spray, 1 spray, Mouth/Throat, Q2H PRN, Tate Whitley Jr., MD    prochlorperazine (COMPAZINE) injection 10 mg, 10 mg, Intravenous, Q6H PRN, Tate Whitley Jr., MD    promethazine (PHENERGAN) tablet 12.5 mg, 12.5 mg, Oral, Q4H PRN **OR** promethazine (PHENERGAN) suppository 12.5 mg, 12.5 mg, Rectal, Q4H PRN, Tate Whitley Jr., MD      Objective   Vital Signs   Temp:  [97.5 °F (36.4 °C)-98.8 °F (37.1 °C)] 98.8 °F (37.1 °C)  Heart Rate:  [66-86] 75  Resp:  [16] 16  BP: ()/(51-61) 98/61    Physical Exam:   General: awake, clearly does not feel  "well  Eyes: no scleral icterus  ENT: no thrush  Cardiovascular: Normal rate  Respiratory: normal work of breathing on room air  GI: Abdomen bandaged with MARVEL drain present  :  no Rodgers catheter  Skin: No rashes  Neurological: Alert and oriented x 3  Psychiatric: Normal mood and affect   Vasc: PIV w/o erythema    Labs:     Lab Results   Component Value Date    WBC 9.21 03/30/2024    HGB 10.4 (L) 03/30/2024    HCT 30.0 (L) 03/30/2024    MCV 84.0 03/30/2024     (L) 03/30/2024       Lab Results   Component Value Date    GLUCOSE 101 (H) 03/30/2024    BUN 9 03/30/2024    CREATININE 0.60 03/30/2024    BCR 15.0 03/30/2024    CO2 24.6 03/30/2024    CALCIUM 8.6 03/30/2024    ALBUMIN 3.7 03/29/2024    AST 50 (H) 03/29/2024    ALT 67 (H) 03/29/2024     No results found for: \"CRP\"    Microbiology:  3/28 BCx: Negative to date  3/29 abdominal wall culture: Rare amounts of coagulase-negative staph and alphahemolytic Streptococcus    Radiology:  CT abdomen/pelvis with findings consistent with perforated viscus    CXR personally reviewed and is negative for pneumonia      ASSESSMENT/PLAN:  Perforated viscus  Leukocytosis  History of Donis-en-Y gastric bypass surgery  Amoxicillin allergy    Thanks to bariatric surgeon for his prompt intervention to help provide source control.  I recommend that we adjust her antibiotics to ceftriaxone 2 g IV every 24 hours, Flagyl 500 mg IV every 8 hours, and fluconazole 400 mg IV every 24 hours with the duration to be determined.  I will check a baseline CRP.  It is good to see that her WBC has normalized.  She is hoping she can eat more soon and work towards getting out of the hospital in the coming days.    Check CBC and CMP in the a.m. for close monitoring while on broad-spectrum antibiotics.    ID will follow. D/w RN re: plan.       Electronically signed by Kimo Child MD at 03/30/24 1228       "

## 2024-04-01 NOTE — PROGRESS NOTES
LOS: 2 days   Patient Care Team:  Provider, No Known as PCP - General    Chief Complaint: Headache    Interval History:   Patient Denies: Nausea, vomiting, chest pain, shortness of air  Patient states she is doing well with positive flatus but no bowel movement      Vital Signs  Temp:  [97.9 °F (36.6 °C)-99.1 °F (37.3 °C)] 97.9 °F (36.6 °C)  Heart Rate:  [66-90] 66  Resp:  [16-18] 18  BP: (102-138)/(72-79) 122/72      Physical Exam:   Heart: RR   Abd: Soft and nondistended; incisions clean, dry intact without erythema; Alexandre drain with serous output   Ext:  No clubbing, cyanosis     Results Review:     I reviewed the patient's new clinical results.    Lab Results (last 24 hours)       Procedure Component Value Units Date/Time    Blood Culture - Blood, Hand, Right [[id number]]  (Normal) Collected: 03/28/24 2042    Specimen: Blood from Hand, Right Updated: 03/31/24 2100     Blood Culture No growth at 3 days    Blood Culture - Blood, Arm, Right [[id number]]  (Normal) Collected: 03/28/24 2042    Specimen: Blood from Arm, Right Updated: 03/31/24 2100     Blood Culture No growth at 3 days            Assessment/Plan:    Perforated bowel    History of gastric bypass    Perforation of viscus    Leukocytosis      38 y.o. female postop day 3 status post laparoscopic closure of gastrojejunostomy perforation with omental patch.  Patient remains afebrile and hemodynamically stable and abdomen soft on exam.  Patient tolerating full liquid diet.  Patient with positive flatus.  White count normal.  Alexandre drain with increased output but still serous without any purulent drainage.  Will try liquid colored drink to see if any noticed in the drain.  If does well today and no evidence of leak then would be okay to discharge home from my standpoint but we will see what infectious disease recommends.      Tate Whitley Jr., MD  Medical Director [facility]  [facility]-Bariatric  Surgery    04/01/24

## 2024-04-01 NOTE — DISCHARGE SUMMARY
"Discharge Summary    Patient name: Radha Vargas    Medical record number: [id number]    Admission date: 3/28/2024  Discharge date:      Attending physician: Dr. Tate Whitley    Primary care physician: Provider, No Known    Referring physician: Tate Whitley Jr., MD  [address] [place] [address]    Condition on discharge: Stable    Primary Diagnoses:  Morbid obesity with co-morbidities    Operative Procedure: Laparoscopic closure of gastrojejunostomy perforation with omental patch and washout     Radha Vargas  is post op day three status post procedure listed. Patient denies shortness of air and lower extremity pain. Feels better than yesterday. No vomiting. Ambulating well and using incentive spirometer.  Patient given purple popsicle as well as other drinks and no discoloration of drain output.          /72 (BP Location: Left arm, Patient Position: Lying)   Pulse 66   Temp 97.9 °F (36.6 °C) (Oral)   Resp 18   Ht 160 cm (63\")   Wt 60.1 kg (132 lb 7.9 oz)   LMP 03/25/2024 (Exact Date)   SpO2 99%   BMI 23.47 kg/m²     General:  alert, appears stated age, and cooperative   Abdomen: soft, bowel sounds active, appropriate tenderness   Incision:   healing well, no drainage, no erythema, no hernia, no seroma, no swelling, no dehiscence, incision well approximated   Heart: Regular rate   Lungs: Clear to auscultation bilaterally     I reviewed the patient's new clinical results.     Lab Results (last 24 hours)       Procedure Component Value Units Date/Time    Basic Metabolic Panel [[id number]]  (Abnormal) Collected: 04/01/24 1029    Specimen: Blood Updated: 04/01/24 1200     Glucose 86 mg/dL      BUN 5 mg/dL      Creatinine 0.46 mg/dL      Sodium 137 mmol/L      Potassium 3.4 mmol/L      Chloride 101 mmol/L      CO2 25.0 mmol/L      Calcium 8.5 mg/dL      BUN/Creatinine Ratio 10.9     Anion Gap 11.0 mmol/L      eGFR 125.8 mL/min/1.73     Narrative:      GFR Normal >60  Chronic " Kidney Disease <60  Kidney Failure <15      CBC (No Diff) [[id number]]  (Abnormal) Collected: 04/01/24 1029    Specimen: Blood Updated: 04/01/24 1120     WBC 4.76 10*3/mm3      RBC 3.44 10*6/mm3      Hemoglobin 9.8 g/dL      Hematocrit 29.0 %      MCV 84.3 fL      MCH 28.5 pg      MCHC 33.8 g/dL      RDW 12.8 %      RDW-SD 37.6 fl      MPV 11.5 fL      Platelets 134 10*3/mm3     Blood Culture - Blood, Hand, Right [[id number]]  (Normal) Collected: 03/28/24 2042    Specimen: Blood from Hand, Right Updated: 03/31/24 2100     Blood Culture No growth at 3 days    Blood Culture - Blood, Arm, Right [[id number]]  (Normal) Collected: 03/28/24 2042    Specimen: Blood from Arm, Right Updated: 03/31/24 2100     Blood Culture No growth at 3 days               Assessment:      Doing well postoperatively.      Plan:   1. Continue Stage 1 diet  2. Continue with ambulation and Incentive spirometry  3. Plan for d/c home  4.  Antibiotics home per infectious disease as listed below    Patient was seen and examined by Dr. Whitley.    Hospital Course: The patient is a very pleasant 38 y.o. female that was admitted to the hospital with perforated viscus who presented to emergency room with complaint abdominal pain.  Patient taken to the operating room that evening and underwent above procedure.  Patient did well postoperatively.  Infectious disease was consulted for cultures growing coagulase-negative staph and alphahemolytic Streptococcus.  Patient initially started on meropenem and Diflucan after getting vancomycin in emergency room.  Patient was then switched over to Rocephin Flagyl and Diflucan continued.  Patient is switched to p.o. as below.  Patient did well postoperatively remained afebrile and hemodynamically stable with normalization of her white blood cell count.  Lovenox was started and SCDs and ambulation were continued for DVT prophylaxis.  Patient started on clear liquid diet and advance to full liquid diet which she tolerated  well.  Patient ready to go home.  Patient was instructed to stay on liquid or soft foods this week until follow-up in the office this Friday.  Patient contact the office this Wednesday to let us know how she is doing and also with drain output.    Discharge medications:      Discharge Medications        New Medications        Instructions Start Date   cefdinir 250 MG/5ML suspension  Commonly known as: OMNICEF   300 mg, Oral, Every 12 Hours Scheduled   Start Date: April 2, 2024     fluconazole 40 MG/ML suspension  Commonly known as: DIFLUCAN   400 mg, Oral, Daily   Start Date: April 2, 2024     metroNIDAZOLE 500 MG tablet  Commonly known as: FLAGYL   500 mg, Oral, Every 8 Hours Scheduled   Start Date: April 2, 2024     sucralfate 1 g tablet  Commonly known as: CARAFATE   1 g, Oral, 4 Times Daily Before Meals & Nightly      traMADol 50 MG tablet  Commonly known as: Ultram   50 mg, Oral, Every 6 Hours PRN             Continue These Medications        Instructions Start Date   sertraline 50 MG tablet  Commonly known as: ZOLOFT   1 tablet, Oral, Daily      ZyrTEC Allergy 10 MG tablet  Generic drug: cetirizine   10 mg, Oral, Daily               Discharge instructions:  Per Bariatric manual; per our protocol      Follow-up appointment: Follow up with Dr. Whitley in the office as scheduled.  If not already scheduled call for appointment at [phone].

## 2024-04-01 NOTE — PROGRESS NOTES
ID NOTE    CC: f/u perforated viscus    Subj: No fever.  Slowly increasing her intake.  Having flatus.  Eager to switch to oral antibiotics and discharge.    Medications:    Current Facility-Administered Medications:     acetaminophen (TYLENOL) tablet 650 mg, 650 mg, Oral, Q4H PRN, Tate Whitley Jr., MD, 650 mg at 04/01/24 0731    albuterol (PROVENTIL) nebulizer solution 0.083% 2.5 mg/3mL, 2.5 mg, Nebulization, Q4H PRN, Tate Whitley Jr., MD    amisulpride (antiemetic) (BARHEMSYS) injection 10 mg, 10 mg, Intravenous, Once PRN, Tate Whitley Jr., MD    cefTRIAXone (ROCEPHIN) 2,000 mg in sodium chloride 0.9 % 100 mL MBP, 2,000 mg, Intravenous, Q24H, Kimo Child MD, Last Rate: 200 mL/hr at 03/31/24 1425, 2,000 mg at 03/31/24 1425    diphenhydrAMINE (BENADRYL) injection 25 mg, 25 mg, Intravenous, Q4H PRN, Tate Whitley Jr., MD    Enoxaparin Sodium (LOVENOX) syringe 40 mg, 40 mg, Subcutaneous, Q24H, Tate Whitley Jr., MD, 40 mg at 03/31/24 0947    fluconazole (DIFLUCAN) IVPB 400 mg, 400 mg, Intravenous, Q24H, Kimo Child MD, Last Rate: 100 mL/hr at 04/01/24 0741, 400 mg at 04/01/24 0741    hydrALAZINE (APRESOLINE) injection 10 mg, 10 mg, Intravenous, Q30 Min PRN, Tate Whitley Jr., MD    HYDROmorphone (DILAUDID) tablet 2 mg, 2 mg, Oral, Q4H PRN, Tate Whitley Jr., MD, 2 mg at 04/01/24 0731    lactated ringers infusion, 75 mL/hr, Intravenous, Continuous, Tate Whitley Jr., MD, Last Rate: 75 mL/hr at 03/31/24 2108, 75 mL/hr at 03/31/24 2108    metoclopramide (REGLAN) injection 10 mg, 10 mg, Intravenous, Q6H, Tate Whitley Jr., MD, 10 mg at 04/01/24 0333    metroNIDAZOLE (FLAGYL) IVPB 500 mg, 500 mg, Intravenous, Q8H, Kimo Child MD, Last Rate: 200 mL/hr at 04/01/24 0619, 500 mg at 04/01/24 0619    [DISCONTINUED] HYDROmorphone (DILAUDID) injection 0.5 mg, 0.5 mg, Intravenous, Q2H PRN, 0.5 mg at 03/30/24 1037 **AND**  naloxone (NARCAN) injection 0.1 mg, 0.1 mg, Intravenous, Q5 Min PRN, Tate Whitley Jr., MD    ondansetron ODT (ZOFRAN-ODT) disintegrating tablet 4 mg, 4 mg, Oral, Q4H PRN **OR** ondansetron (ZOFRAN) injection 4 mg, 4 mg, Intravenous, Q4H PRN, Tate Whitley Jr., MD    pantoprazole (PROTONIX) EC tablet 40 mg, 40 mg, Oral, Q AM, Tate Whitley Jr., MD, 40 mg at 04/01/24 0619    phenol (CHLORASEPTIC) 1.4 % liquid 1 spray, 1 spray, Mouth/Throat, Q2H PRN, Tate Whitley Jr., MD    prochlorperazine (COMPAZINE) injection 10 mg, 10 mg, Intravenous, Q6H PRN, Tate Whitley Jr., MD    promethazine (PHENERGAN) tablet 12.5 mg, 12.5 mg, Oral, Q4H PRN **OR** promethazine (PHENERGAN) suppository 12.5 mg, 12.5 mg, Rectal, Q4H PRN, Tate Whitley Jr., MD    sucralfate (CARAFATE) tablet 1 g, 1 g, Oral, 4x Daily AC & at Bedtime, Tate Whitley Jr., MD, 1 g at 04/01/24 0741      Objective   Vital Signs   Temp:  [97.9 °F (36.6 °C)-99.1 °F (37.3 °C)] 97.9 °F (36.6 °C)  Heart Rate:  [66-90] 66  Resp:  [16-18] 18  BP: (102-138)/(72-79) 122/72    Physical Exam:   General: awake, more alert and comfortable today  Eyes: no scleral icterus  Cardiovascular: Normal rate  Respiratory: normal work of breathing on room air  GI: Abdomen bandaged with MARVEL drain present  :  no Rodgers catheter  Skin: No rashes  Neurological: Alert and oriented x 3  Psychiatric: Normal mood and affect     Labs:   Blood culture and body fluid culture reviewed today  Lab Results   Component Value Date    WBC 5.48 03/31/2024    HGB 9.9 (L) 03/31/2024    HCT 29.2 (L) 03/31/2024    MCV 86.4 03/31/2024     (L) 03/31/2024       Lab Results   Component Value Date    GLUCOSE 86 03/31/2024    BUN 6 03/31/2024    CREATININE 0.58 03/31/2024    BCR 10.3 03/31/2024    CO2 24.0 03/31/2024    CALCIUM 7.9 (L) 03/31/2024    ALBUMIN 3.0 (L) 03/31/2024    AST 23 03/31/2024    ALT 35 (H) 03/31/2024     Lab Results   Component Value Date    CRP  24.13 (H) 03/30/2024     Microbiology:  3/28 BCx: Negative to date  3/29 abdominal wall culture: Rare amounts of coagulase-negative staph and alphahemolytic Streptococcus    Prior Radiology:  CT abdomen/pelvis with findings consistent with perforated viscus    ASSESSMENT/PLAN:  Perforated viscus  Leukocytosis - resolved  History of Donis-en-Y gastric bypass surgery  Amoxicillin allergy    She is afebrile with normal white blood cell count.  She is tolerating soft foods.  She is eager for discharge and has been cleared by her surgeon and I have been asked if that is okay from infectious diseases standpoint.  She will get her doses of intravenous ceftriaxone and fluconazole here today.    I would recommend that we continue her on antibiotics through 4/12/2024.  I will switch the regimen to something similar that she can take by mouth.  I recommend cefdinir oral solution 300 mg every 12 hours, fluconazole oral solution 400 mg every 24 hours, and Flagyl 500 mg p.o. every 8 hours.    Will discuss with pharmacist to make sure all of these are available and ask whether or not the Flagyl can be crushed.    If her primary team could please send these prescriptions meds to bed at the time of discharge/medication reconciliation that I would greatly appreciated.  I have the correct stop date set.    Discussed with nurse.    Thank you for allowing me to be involved in the care of this patient. Infectious diseases will sign off at this time with antibiotics plan in place, but please call me at [phone] if any further ID questions or new ID concerns.

## 2024-04-02 ENCOUNTER — READMISSION MANAGEMENT (OUTPATIENT)
Dept: CALL CENTER | Facility: HOSPITAL | Age: 39
End: 2024-04-02
Payer: COMMERCIAL

## 2024-04-02 LAB
BACTERIA SPEC AEROBE CULT: NORMAL
BACTERIA SPEC AEROBE CULT: NORMAL

## 2024-04-02 NOTE — OUTREACH NOTE
Prep Survey      Flowsheet Row Responses   [facility] facility patient discharged from? [place]   Is LACE score < 7 ? No   Eligibility Readm Mgmt   Discharge diagnosis Perforated bowel   Does the patient have one of the following disease processes/diagnoses(primary or secondary)? General Surgery   Does the patient have Home health ordered? No   Is there a DME ordered? No   Medication alerts for this patient see AVS, po ABX   Prep survey completed? Yes            Vandana STEPHENS - Registered Nurse

## 2024-04-02 NOTE — PROGRESS NOTES
Case Management Discharge Note      Final Note: DC'd home 4/1                      Transportation Services  Private: Car    Final Discharge Disposition Code: 01 - home or self-care

## 2024-04-03 NOTE — PAYOR COMM NOTE
"Rosa Lutz (38 y.o. Female)     PLEASE SEE ATTACHED FOR DC NOTICE    REF #[id number]     THANK YOU  KAYLYN BETHEA RN/ DEPT  [facility]   [phone]  [phone]        Date of Birth   1985    Social Security Number       Address   [address] [place] [address]    Home Phone   [phone]    MRN   [id number]       Islam   Mormonism    Marital Status   Single                            Admission Date   3/28/24    Admission Type   Emergency    Admitting Provider   Tate Whitley Jr., MD    Attending Provider       Department, Room/Bed   [facility], S603/1       Discharge Date   4/1/2024    Discharge Disposition   Home or Self Care    Discharge Destination                                 Attending Provider: (none)   Allergies: Penicillins, Sulfa Antibiotics    Isolation: None   Infection: None   Code Status: Prior    Ht: 160 cm (63\")   Wt: 60.1 kg (132 lb 7.9 oz)    Admission Cmt: None   Principal Problem: Perforated bowel [K63.1]                   Active Insurance as of 3/28/2024       Primary Coverage       Payor Plan Insurance Group Employer/Plan Group    [employer]HEM BLUE CROSS ANTHEM BLUE CROSS BLUE SHIELD PPO [id number]       Payor Plan Address Payor Plan Phone Number Payor Plan Fax Number Effective Dates    [address] [phone]  1/1/2021 - None Entered    [facility] [id number]         Subscriber Name Subscriber Birth Date Member ID       ROSA LUTZ 1985 [id number]                     Emergency Contacts        (Rel.) Home Phone Work Phone Mobile Phone    MARYBELMONAE CONNOLLY (Significant Other) -- -- [phone]              [place]: [facility] [id number]  Tax ID [id number]     Discharge Summary        Tate Whitley Jr., MD at 04/01/24 1343          Discharge Summary    Patient name: Rosa Lutz    Medical record number: [id number]    Admission date: 3/28/2024  Discharge date:      Attending physician: Dr. Ybarra " "[place]    Primary care physician: Provider, No Known    Referring physician: Tate Whitley Jr., MD  [address] [place] [address]    Condition on discharge: Stable    Primary Diagnoses:  Morbid obesity with co-morbidities    Operative Procedure: Laparoscopic closure of gastrojejunostomy perforation with omental patch and washout    Subjective Radha Vargas  is post op day three status post procedure listed. Patient denies shortness of air and lower extremity pain. Feels better than yesterday. No vomiting. Ambulating well and using incentive spirometer.  Patient given purple popsicle as well as other drinks and no discoloration of drain output.      Objective   /72 (BP Location: Left arm, Patient Position: Lying)   Pulse 66   Temp 97.9 °F (36.6 °C) (Oral)   Resp 18   Ht 160 cm (63\")   Wt 60.1 kg (132 lb 7.9 oz)   LMP 03/25/2024 (Exact Date)   SpO2 99%   BMI 23.47 kg/m²     General:  alert, appears stated age, and cooperative   Abdomen: soft, bowel sounds active, appropriate tenderness   Incision:   healing well, no drainage, no erythema, no hernia, no seroma, no swelling, no dehiscence, incision well approximated   Heart: Regular rate   Lungs: Clear to auscultation bilaterally     I reviewed the patient's new clinical results.     Lab Results (last 24 hours)       Procedure Component Value Units Date/Time    Basic Metabolic Panel [[id number]]  (Abnormal) Collected: 04/01/24 1029    Specimen: Blood Updated: 04/01/24 1200     Glucose 86 mg/dL      BUN 5 mg/dL      Creatinine 0.46 mg/dL      Sodium 137 mmol/L      Potassium 3.4 mmol/L      Chloride 101 mmol/L      CO2 25.0 mmol/L      Calcium 8.5 mg/dL      BUN/Creatinine Ratio 10.9     Anion Gap 11.0 mmol/L      eGFR 125.8 mL/min/1.73     Narrative:      GFR Normal >60  Chronic Kidney Disease <60  Kidney Failure <15      CBC (No Diff) [[id number]]  (Abnormal) Collected: 04/01/24 1029    Specimen: Blood Updated: 04/01/24 1120     " WBC 4.76 10*3/mm3      RBC 3.44 10*6/mm3      Hemoglobin 9.8 g/dL      Hematocrit 29.0 %      MCV 84.3 fL      MCH 28.5 pg      MCHC 33.8 g/dL      RDW 12.8 %      RDW-SD 37.6 fl      MPV 11.5 fL      Platelets 134 10*3/mm3     Blood Culture - Blood, Hand, Right [[id number]]  (Normal) Collected: 03/28/24 2042    Specimen: Blood from Hand, Right Updated: 03/31/24 2100     Blood Culture No growth at 3 days    Blood Culture - Blood, Arm, Right [[id number]]  (Normal) Collected: 03/28/24 2042    Specimen: Blood from Arm, Right Updated: 03/31/24 2100     Blood Culture No growth at 3 days              Assessment:     Assessment Doing well postoperatively.     Plan:   1. Continue Stage 1 diet  2. Continue with ambulation and Incentive spirometry  3. Plan for d/c home  4.  Antibiotics home per infectious disease as listed below    Patient was seen and examined by Dr. Whitley.    Hospital Course: The patient is a very pleasant 38 y.o. female that was admitted to the hospital with perforated viscus who presented to emergency room with complaint abdominal pain.  Patient taken to the operating room that evening and underwent above procedure.  Patient did well postoperatively.  Infectious disease was consulted for cultures growing coagulase-negative staph and alphahemolytic Streptococcus.  Patient initially started on meropenem and Diflucan after getting vancomycin in emergency room.  Patient was then switched over to Rocephin Flagyl and Diflucan continued.  Patient is switched to p.o. as below.  Patient did well postoperatively remained afebrile and hemodynamically stable with normalization of her white blood cell count.  Lovenox was started and SCDs and ambulation were continued for DVT prophylaxis.  Patient started on clear liquid diet and advance to full liquid diet which she tolerated well.  Patient ready to go home.  Patient was instructed to stay on liquid or soft foods this week until follow-up in the office this Friday.   Patient contact the office this Wednesday to let us know how she is doing and also with drain output.    Discharge medications:      Discharge Medications        New Medications        Instructions Start Date   cefdinir 250 MG/5ML suspension  Commonly known as: OMNICEF   300 mg, Oral, Every 12 Hours Scheduled   Start Date: April 2, 2024     fluconazole 40 MG/ML suspension  Commonly known as: DIFLUCAN   400 mg, Oral, Daily   Start Date: April 2, 2024     metroNIDAZOLE 500 MG tablet  Commonly known as: FLAGYL   500 mg, Oral, Every 8 Hours Scheduled   Start Date: April 2, 2024     sucralfate 1 g tablet  Commonly known as: CARAFATE   1 g, Oral, 4 Times Daily Before Meals & Nightly      traMADol 50 MG tablet  Commonly known as: Ultram   50 mg, Oral, Every 6 Hours PRN             Continue These Medications        Instructions Start Date   sertraline 50 MG tablet  Commonly known as: ZOLOFT   1 tablet, Oral, Daily      ZyrTEC Allergy 10 MG tablet  Generic drug: cetirizine   10 mg, Oral, Daily               Discharge instructions:  Per Bariatric manual; per our protocol      Follow-up appointment: Follow up with Dr. Whitley in the office as scheduled.  If not already scheduled call for appointment at [phone].      Electronically signed by Yasir Whitley Jr., MD at 04/01/24 134       Discharge Order (From admission, onward)       Start     Ordered    04/01/24 1340  Discharge patient  Once        Expected Discharge Date: 04/01/24   Discharge Disposition: Home or Self Care   Physician of Record for Attribution - Please select from Treatment Team: YASIR WHITLEY JR [[id number]]   Review needed by CMO to determine Physician of Record: No      Question Answer Comment   Physician of Record for Attribution - Please select from Treatment Team YASIR WHITLEY JR    Review needed by CMO to determine Physician of Record No        04/01/24 2852

## 2024-04-05 ENCOUNTER — READMISSION MANAGEMENT (OUTPATIENT)
Dept: CALL CENTER | Facility: HOSPITAL | Age: 39
End: 2024-04-05
Payer: COMMERCIAL

## 2024-04-05 NOTE — OUTREACH NOTE
General Surgery Week 1 Survey      Flowsheet Row Responses   [facility] patient discharged from? [place]   Does the patient have one of the following disease processes/diagnoses(primary or secondary)? General Surgery   Week 1 attempt successful? Yes   Call start time 1452   Call end time 1502   List who call center can speak with pt   Medication alerts for this patient abx.   Meds reviewed with patient/caregiver? Yes   Is the patient having any side effects they believe may be caused by any medication additions or changes? No   Does the patient have all medications related to this admission filled (includes all antibiotics, pain medications, etc.) Yes   Is the patient taking all medications as directed (includes completed medication regime)? Yes   Does the patient have a follow up appointment scheduled with their surgeon? Yes   Has the patient kept scheduled appointments due by today? Yes   Has home health visited the patient within 72 hours of discharge? N/A   Psychosocial issues? No   Did the patient receive a copy of their discharge instructions? Yes   Nursing interventions Reviewed instructions with patient   What is the patient's perception of their health status since discharge? Improving   Is the patient /caregiver able to teach back basic post-op care? Drive as instructed by MD in discharge instructions, Take showers only when approved by MD-sponge bathe until then, No tub bath, swimming, or hot tub until instructed by MD   Is the patient/caregiver able to teach back signs and symptoms of incisional infection? Increased redness, swelling or pain at the incisonal site, Increased drainage or bleeding, Incisional warmth, Pus or odor from incision, Fever   Is the patient/caregiver able to teach back steps to recovery at home? Set small, achievable goals for return to baseline health   Is the patient/caregiver able to teach back the hierarchy of who to call/visit for symptoms/problems? PCP, Specialist,  Home health nurse, Urgent Care, ED, 911 Yes   Week 1 call completed? Yes   Wrap up additional comments Pt reports recovering nicely. Pt does have soreness but is ok. Pt has had surgical f/u and pcp f/u. Pt has all medications.   Call end time 1502            Cyndee OLMEDO - Registered Nurse

## (undated) DEVICE — SUT MNCRYL PLS ANTIB UD 4/0 PS2 18IN

## (undated) DEVICE — TROCAR: Brand: KII SLEEVE

## (undated) DEVICE — COVER,LIGHT HANDLE,FLX,2/PK: Brand: MEDLINE INDUSTRIES, INC.

## (undated) DEVICE — SOL NACL 0.9PCT 1000ML

## (undated) DEVICE — SUT SILK 2/0 SH 30IN K833H

## (undated) DEVICE — 2, DISPOSABLE SUCTION/IRRIGATOR WITH DISPOSABLE TIP: Brand: STRYKEFLOW

## (undated) DEVICE — GLV SURG SENSICARE POLYISPRN W/ALOE PF LF 6 GRN STRL

## (undated) DEVICE — LAPAROSCOPIC SMOKE FILTRATION SYSTEM: Brand: PALL LAPAROSHIELD® PLUS LAPAROSCOPIC SMOKE FILTRATION SYSTEM

## (undated) DEVICE — GLV SURG SENSICARE PI MIC PF SZ6 LF STRL

## (undated) DEVICE — TROCAR: Brand: KII OPTICAL ACCESS SYSTEM

## (undated) DEVICE — DEV COND GAS LAP INSUFLOW W/LUER CONN

## (undated) DEVICE — PK BARIATRIC 10 40 70

## (undated) DEVICE — ANTIBACTERIAL UNDYED BRAIDED (POLYGLACTIN 910), SYNTHETIC ABSORBABLE SUTURE: Brand: COATED VICRYL

## (undated) DEVICE — APPL CHLORAPREP HI/LITE 26ML ORNG

## (undated) DEVICE — KIT,ANTI FOG,W/SPONGE & FLUID,SOFT PACK: Brand: MEDLINE

## (undated) DEVICE — PATIENT RETURN ELECTRODE, SINGLE-USE, CONTACT QUALITY MONITORING, ADULT, WITH 9FT CORD, FOR PATIENTS WEIGING OVER 33LBS. (15KG): Brand: MEGADYNE

## (undated) DEVICE — ENDOPATH XCEL BLADELESS TROCARS WITH STABILITY SLEEVES: Brand: ENDOPATH XCEL

## (undated) DEVICE — ENDOPATH XCEL UNIVERSAL TROCAR STABLILITY SLEEVES: Brand: ENDOPATH XCEL

## (undated) DEVICE — VIOLET BRAIDED (POLYGLACTIN 910), SYNTHETIC ABSORBABLE SUTURE: Brand: COATED VICRYL

## (undated) DEVICE — HARMONIC ACE +7 LAPAROSCOPIC SHEARS ADVANCED HEMOSTASIS 5MM DIAMETER 45CM SHAFT LENGTH  FOR USE WITH GRAY HAND PIECE ONLY: Brand: HARMONIC ACE

## (undated) DEVICE — DRAPE,REIN 53X77,STERILE: Brand: MEDLINE

## (undated) DEVICE — GLV SURG SENSICARE PI PF LF 8.5 GRN STRL

## (undated) DEVICE — SUT ETHIB 2/0 CX46D

## (undated) DEVICE — GLV SURG SENSICARE PI MIC PF SZ8.5 LF STRL

## (undated) DEVICE — ADHS SKIN SURG TISS VISC PREMIERPRO EXOFIN HI/VISC FAST/DRY

## (undated) DEVICE — MEDICINE CUP, GRADUATED, STER: Brand: MEDLINE

## (undated) DEVICE — INTENDED FOR TISSUE SEPARATION, AND OTHER PROCEDURES THAT REQUIRE A SHARP SURGICAL BLADE TO PUNCTURE OR CUT.: Brand: BARD-PARKER ® CARBON RIB-BACK BLADES

## (undated) DEVICE — DRAPE,UTILITY,TAPE,15X26,STERILE: Brand: MEDLINE

## (undated) DEVICE — MINI ENDOCUT SCISSOR TIP, DISPOSABLE: Brand: RENEW